# Patient Record
Sex: FEMALE | Race: WHITE | NOT HISPANIC OR LATINO | Employment: UNEMPLOYED | URBAN - METROPOLITAN AREA
[De-identification: names, ages, dates, MRNs, and addresses within clinical notes are randomized per-mention and may not be internally consistent; named-entity substitution may affect disease eponyms.]

---

## 2018-08-01 ENCOUNTER — HOSPITAL ENCOUNTER (OUTPATIENT)
Facility: MEDICAL CENTER | Age: 33
End: 2018-08-01
Attending: OBSTETRICS & GYNECOLOGY | Admitting: OBSTETRICS & GYNECOLOGY

## 2018-08-01 VITALS
BODY MASS INDEX: 28.88 KG/M2 | HEIGHT: 63 IN | SYSTOLIC BLOOD PRESSURE: 118 MMHG | TEMPERATURE: 98 F | DIASTOLIC BLOOD PRESSURE: 58 MMHG | WEIGHT: 163 LBS

## 2018-08-01 LAB
A1 MICROGLOB PLACENTAL VAG QL: NEGATIVE
A1 MICROGLOB PLACENTAL VAG QL: NEGATIVE

## 2018-08-01 PROCEDURE — 84112 EVAL AMNIOTIC FLUID PROTEIN: CPT

## 2018-08-01 PROCEDURE — 59025 FETAL NON-STRESS TEST: CPT | Performed by: OBSTETRICS & GYNECOLOGY

## 2018-08-01 ASSESSMENT — PAIN SCALES - GENERAL
PAINLEVEL_OUTOF10: 0
PAINLEVEL_OUTOF10: 0

## 2018-08-02 ENCOUNTER — HOSPITAL ENCOUNTER (INPATIENT)
Facility: MEDICAL CENTER | Age: 33
LOS: 15 days | End: 2018-08-17
Attending: OBSTETRICS & GYNECOLOGY | Admitting: OBSTETRICS & GYNECOLOGY
Payer: COMMERCIAL

## 2018-08-02 ENCOUNTER — APPOINTMENT (OUTPATIENT)
Dept: RADIOLOGY | Facility: MEDICAL CENTER | Age: 33
End: 2018-08-02
Attending: OBSTETRICS & GYNECOLOGY
Payer: COMMERCIAL

## 2018-08-02 LAB
ALBUMIN SERPL BCP-MCNC: 3.7 G/DL (ref 3.2–4.9)
ALBUMIN/GLOB SERPL: 1.4 G/DL
ALP SERPL-CCNC: 89 U/L (ref 30–99)
ALT SERPL-CCNC: 11 U/L (ref 2–50)
ANION GAP SERPL CALC-SCNC: 9 MMOL/L (ref 0–11.9)
AST SERPL-CCNC: 16 U/L (ref 12–45)
BILIRUB SERPL-MCNC: 0.6 MG/DL (ref 0.1–1.5)
BUN SERPL-MCNC: 4 MG/DL (ref 8–22)
CALCIUM SERPL-MCNC: 8.8 MG/DL (ref 8.5–10.5)
CHLORIDE SERPL-SCNC: 108 MMOL/L (ref 96–112)
CO2 SERPL-SCNC: 20 MMOL/L (ref 20–33)
CREAT SERPL-MCNC: 0.37 MG/DL (ref 0.5–1.4)
ERYTHROCYTE [DISTWIDTH] IN BLOOD BY AUTOMATED COUNT: 45.2 FL (ref 35.9–50)
GLOBULIN SER CALC-MCNC: 2.6 G/DL (ref 1.9–3.5)
GLUCOSE SERPL-MCNC: 85 MG/DL (ref 65–99)
HCT VFR BLD AUTO: 31.6 % (ref 37–47)
HGB BLD-MCNC: 10.1 G/DL (ref 12–16)
HOLDING TUBE BB 8507: NORMAL
MAGNESIUM SERPL-MCNC: 1.7 MG/DL (ref 1.5–2.5)
MAGNESIUM SERPL-MCNC: 3.7 MG/DL (ref 1.5–2.5)
MAGNESIUM SERPL-MCNC: 5.1 MG/DL (ref 1.5–2.5)
MCH RBC QN AUTO: 28.2 PG (ref 27–33)
MCHC RBC AUTO-ENTMCNC: 32 G/DL (ref 33.6–35)
MCV RBC AUTO: 88.3 FL (ref 81.4–97.8)
PLATELET # BLD AUTO: 229 K/UL (ref 164–446)
PMV BLD AUTO: 10 FL (ref 9–12.9)
POTASSIUM SERPL-SCNC: 3.8 MMOL/L (ref 3.6–5.5)
PROT SERPL-MCNC: 6.3 G/DL (ref 6–8.2)
RBC # BLD AUTO: 3.58 M/UL (ref 4.2–5.4)
SODIUM SERPL-SCNC: 137 MMOL/L (ref 135–145)
WBC # BLD AUTO: 8.2 K/UL (ref 4.8–10.8)

## 2018-08-02 PROCEDURE — 76815 OB US LIMITED FETUS(S): CPT

## 2018-08-02 PROCEDURE — 700111 HCHG RX REV CODE 636 W/ 250 OVERRIDE (IP): Performed by: OBSTETRICS & GYNECOLOGY

## 2018-08-02 PROCEDURE — 83735 ASSAY OF MAGNESIUM: CPT

## 2018-08-02 PROCEDURE — 770002 HCHG ROOM/CARE - OB PRIVATE (112)

## 2018-08-02 PROCEDURE — 59025 FETAL NON-STRESS TEST: CPT | Performed by: OBSTETRICS & GYNECOLOGY

## 2018-08-02 PROCEDURE — 80053 COMPREHEN METABOLIC PANEL: CPT

## 2018-08-02 PROCEDURE — 302135 SEQUENTIAL COMPRESSION MACHINE: Performed by: OBSTETRICS & GYNECOLOGY

## 2018-08-02 PROCEDURE — A9270 NON-COVERED ITEM OR SERVICE: HCPCS | Performed by: OBSTETRICS & GYNECOLOGY

## 2018-08-02 PROCEDURE — 700102 HCHG RX REV CODE 250 W/ 637 OVERRIDE(OP): Performed by: OBSTETRICS & GYNECOLOGY

## 2018-08-02 PROCEDURE — 700105 HCHG RX REV CODE 258: Performed by: OBSTETRICS & GYNECOLOGY

## 2018-08-02 PROCEDURE — 36415 COLL VENOUS BLD VENIPUNCTURE: CPT

## 2018-08-02 PROCEDURE — 302790 HCHG STAT ANTEPARTUM CARE, DAILY

## 2018-08-02 PROCEDURE — 87653 STREP B DNA AMP PROBE: CPT

## 2018-08-02 PROCEDURE — 85027 COMPLETE CBC AUTOMATED: CPT

## 2018-08-02 PROCEDURE — 700105 HCHG RX REV CODE 258

## 2018-08-02 RX ORDER — AZITHROMYCIN 250 MG/1
1000 TABLET, FILM COATED ORAL DAILY
Status: COMPLETED | OUTPATIENT
Start: 2018-08-02 | End: 2018-08-02

## 2018-08-02 RX ORDER — MAGNESIUM SULFATE HEPTAHYDRATE 40 MG/ML
2 INJECTION, SOLUTION INTRAVENOUS ONCE
Status: DISCONTINUED | OUTPATIENT
Start: 2018-08-02 | End: 2018-08-02

## 2018-08-02 RX ORDER — MAGNESIUM SULFATE HEPTAHYDRATE 40 MG/ML
4 INJECTION, SOLUTION INTRAVENOUS ONCE
Status: COMPLETED | OUTPATIENT
Start: 2018-08-02 | End: 2018-08-02

## 2018-08-02 RX ORDER — SODIUM CHLORIDE 9 MG/ML
INJECTION, SOLUTION INTRAVENOUS
Status: COMPLETED
Start: 2018-08-02 | End: 2018-08-02

## 2018-08-02 RX ORDER — AMPICILLIN 2 G/1
2 INJECTION, POWDER, FOR SOLUTION INTRAVENOUS EVERY 6 HOURS
Status: COMPLETED | OUTPATIENT
Start: 2018-08-02 | End: 2018-08-04

## 2018-08-02 RX ORDER — ONDANSETRON 2 MG/ML
4 INJECTION INTRAMUSCULAR; INTRAVENOUS EVERY 6 HOURS PRN
Status: DISCONTINUED | OUTPATIENT
Start: 2018-08-02 | End: 2018-08-14

## 2018-08-02 RX ORDER — MAGNESIUM SULFATE HEPTAHYDRATE 40 MG/ML
2 INJECTION, SOLUTION INTRAVENOUS CONTINUOUS
Status: DISCONTINUED | OUTPATIENT
Start: 2018-08-02 | End: 2018-08-05

## 2018-08-02 RX ORDER — SODIUM CHLORIDE, SODIUM LACTATE, POTASSIUM CHLORIDE, CALCIUM CHLORIDE 600; 310; 30; 20 MG/100ML; MG/100ML; MG/100ML; MG/100ML
INJECTION, SOLUTION INTRAVENOUS CONTINUOUS
Status: DISCONTINUED | OUTPATIENT
Start: 2018-08-02 | End: 2018-08-08

## 2018-08-02 RX ORDER — SODIUM CHLORIDE 9 MG/ML
INJECTION, SOLUTION INTRAVENOUS
Status: ACTIVE
Start: 2018-08-02 | End: 2018-08-03

## 2018-08-02 RX ORDER — BETAMETHASONE SODIUM PHOSPHATE AND BETAMETHASONE ACETATE 3; 3 MG/ML; MG/ML
12 INJECTION, SUSPENSION INTRA-ARTICULAR; INTRALESIONAL; INTRAMUSCULAR; SOFT TISSUE EVERY 24 HOURS
Status: COMPLETED | OUTPATIENT
Start: 2018-08-02 | End: 2018-08-03

## 2018-08-02 RX ADMIN — MAGNESIUM SULFATE IN WATER 4 G: 40 INJECTION, SOLUTION INTRAVENOUS at 10:52

## 2018-08-02 RX ADMIN — SODIUM CHLORIDE, POTASSIUM CHLORIDE, SODIUM LACTATE AND CALCIUM CHLORIDE: 600; 310; 30; 20 INJECTION, SOLUTION INTRAVENOUS at 10:45

## 2018-08-02 RX ADMIN — MAGNESIUM SULFATE IN WATER 2 G/HR: 40 INJECTION, SOLUTION INTRAVENOUS at 11:10

## 2018-08-02 RX ADMIN — SODIUM CHLORIDE 100 ML: 900 INJECTION INTRAVENOUS at 22:10

## 2018-08-02 RX ADMIN — BETAMETHASONE SODIUM PHOSPHATE AND BETAMETHASONE ACETATE 12 MG: 3; 3 INJECTION, SUSPENSION INTRA-ARTICULAR; INTRALESIONAL; INTRAMUSCULAR at 11:18

## 2018-08-02 RX ADMIN — AMPICILLIN SODIUM 2 G: 2 INJECTION, POWDER, FOR SOLUTION INTRAMUSCULAR; INTRAVENOUS at 22:10

## 2018-08-02 RX ADMIN — MAGNESIUM SULFATE IN WATER 2.5 G/HR: 40 INJECTION, SOLUTION INTRAVENOUS at 20:22

## 2018-08-02 RX ADMIN — AMPICILLIN SODIUM 2 G: 2 INJECTION, POWDER, FOR SOLUTION INTRAMUSCULAR; INTRAVENOUS at 16:14

## 2018-08-02 RX ADMIN — AZITHROMYCIN 1000 MG: 250 TABLET, FILM COATED ORAL at 16:44

## 2018-08-02 RX ADMIN — SODIUM CHLORIDE 100 ML: 900 INJECTION INTRAVENOUS at 16:15

## 2018-08-02 ASSESSMENT — COPD QUESTIONNAIRES
DURING THE PAST 4 WEEKS HOW MUCH DID YOU FEEL SHORT OF BREATH: NONE/LITTLE OF THE TIME
COPD SCREENING SCORE: 0
DO YOU EVER COUGH UP ANY MUCUS OR PHLEGM?: NO/ONLY WITH OCCASIONAL COLDS OR INFECTIONS
HAVE YOU SMOKED AT LEAST 100 CIGARETTES IN YOUR ENTIRE LIFE: NO/DON'T KNOW
IN THE PAST 12 MONTHS DO YOU DO LESS THAN YOU USED TO BECAUSE OF YOUR BREATHING PROBLEMS: DISAGREE/UNSURE

## 2018-08-02 ASSESSMENT — PAIN SCALES - GENERAL
PAINLEVEL_OUTOF10: 2
PAINLEVEL_OUTOF10: 0
PAINLEVEL_OUTOF10: 0
PAINLEVEL_OUTOF10: 4
PAINLEVEL_OUTOF10: 0
PAINLEVEL_OUTOF10: 0
PAINLEVEL_OUTOF10: 4
PAINLEVEL_OUTOF10: 0

## 2018-08-02 ASSESSMENT — PATIENT HEALTH QUESTIONNAIRE - PHQ9
1. LITTLE INTEREST OR PLEASURE IN DOING THINGS: NOT AT ALL
2. FEELING DOWN, DEPRESSED, IRRITABLE, OR HOPELESS: NOT AT ALL
SUM OF ALL RESPONSES TO PHQ9 QUESTIONS 1 AND 2: 0
1. LITTLE INTEREST OR PLEASURE IN DOING THINGS: NOT AT ALL
SUM OF ALL RESPONSES TO PHQ9 QUESTIONS 1 AND 2: 0
2. FEELING DOWN, DEPRESSED, IRRITABLE, OR HOPELESS: NOT AT ALL

## 2018-08-02 ASSESSMENT — LIFESTYLE VARIABLES
EVER_SMOKED: NEVER
ALCOHOL_USE: NO

## 2018-08-02 NOTE — H&P
History and Physical      Lou Vergara is a 32 y.o. year old female  at 32w2d who presents for gush of clear fluid since 330 pm yesterday.  She was evaluated on L&D with negative amnisure.  She continued to soak several pads at home, and had another gush of fluid this morning.  Received prenatal care in [place], but has not seen anyone here.     Subjective:   positive  For CTXS.   negative Feels pain   positive for LOF  negative for vaginal bleeding.   positive for fetal movement    ROS: Pertinent items are noted in HPI.    No past medical history on file. PMHx - unremarkable  No past surgical history on file. Hx of C/S x 3  OB History    Para Term  AB Living   4 3 2 1   3   SAB TAB Ectopic Molar Multiple Live Births                    # Outcome Date GA Lbr Aki/2nd Weight Sex Delivery Anes PTL Lv   4 Current            3             2 Term            1 Term                 Social History     Social History   • Marital status:      Spouse name: N/A   • Number of children: N/A   • Years of education: N/A     Occupational History   • Not on file.     Social History Main Topics   • Smoking status: Not on file   • Smokeless tobacco: Not on file   • Alcohol use Not on file   • Drug use: Unknown   • Sexual activity: Not on file     Other Topics Concern   • Not on file     Social History Narrative   • No narrative on file     Allergies: Patient has no known allergies.    Current Facility-Administered Medications:   •  lactated ringers infusion, , Intravenous, Continuous, Martina Marquez M.D.  •  ondansetron (ZOFRAN) syringe/vial injection 4 mg, 4 mg, Intravenous, Q6HRS PRN, Martina Marquez M.D.  •  magnesium sulfate 20 g/500mL infusion, 2 g/hr, Intravenous, Continuous, Martina Marquez M.D., Last Rate: 50 mL/hr at 18 1110, 2 g/hr at 18 1110  •  ampicillin (OMNIPEN) injection 2 g, 2 g, Intravenous, Q6HRS, Martina Marquez M.D.  •  azithromycin (ZITHROMAX) tablet 1,000 mg,  1,000 mg, Oral, DAILY, Martina Marquez M.D.  •  betamethasone acetate-betamethasone sodium phosphate (CELESTONE) injection 12 mg, 12 mg, Intramuscular, Q24HR, Martina Marquez M.D., 12 mg at 08/02/18 1118    Prenatal care with in [place] starting at 7 weeks with following problems:  There are no active problems to display for this patient.    Hx of C/S x 3  Hx of PTD at 34 wks  Hx of Tobacco use          Objective:      Blood pressure 118/61, pulse 75, resp. rate 18, SpO2 98 %.    General:   no acute distress   Skin:   normal   HEENT:  PERRLA   Lungs:   CTA bilateral   Heart:   S1, S2 normal, no murmur, click, rub or gallop, regular rate and rhythm, brisk carotid upstroke without bruits, peripheral pulses very brisk, chest is clear without rales or wheezing, no pedal edema, no JVD, no hepatosplenomegaly   Abdomen:   gravid, NT   EFW:  2000 grams   Pelvis:  adequate with gynecoid pelvis   FHT:  140s BPM, moderate variability, + accels   Uterine Size: S=D   Presentations: Cephalic   Cervix:     Dilation: Closed    Effacement: Long    Station:  Floating    Consistency: Firm    Position: Posterior     Lab Review  Lab:   Blood type: A+     Recent Results (from the past 5880 hour(s))   AMNISURE ROM ASSAY    Collection Time: 08/01/18  5:30 PM   Result Value Ref Range    AmniSure ROM Negative Negative   AMNISURE ROM ASSAY    Collection Time: 08/01/18  7:29 PM   Result Value Ref Range    AmniSure ROM Negative Negative   CBC WITHOUT DIFFERENTIAL    Collection Time: 08/02/18 10:15 AM   Result Value Ref Range    WBC 8.2 4.8 - 10.8 K/uL    RBC 3.58 (L) 4.20 - 5.40 M/uL    Hemoglobin 10.1 (L) 12.0 - 16.0 g/dL    Hematocrit 31.6 (L) 37.0 - 47.0 %    MCV 88.3 81.4 - 97.8 fL    MCH 28.2 27.0 - 33.0 pg    MCHC 32.0 (L) 33.6 - 35.0 g/dL    RDW 45.2 35.9 - 50.0 fL    Platelet Count 229 164 - 446 K/uL    MPV 10.0 9.0 - 12.9 fL   COMP METABOLIC PANEL    Collection Time: 08/02/18 10:15 AM   Result Value Ref Range    Sodium 137 135 - 145  mmol/L    Potassium 3.8 3.6 - 5.5 mmol/L    Chloride 108 96 - 112 mmol/L    Co2 20 20 - 33 mmol/L    Anion Gap 9.0 0.0 - 11.9    Glucose 85 65 - 99 mg/dL    Bun 4 (L) 8 - 22 mg/dL    Creatinine 0.37 (L) 0.50 - 1.40 mg/dL    Calcium 8.8 8.5 - 10.5 mg/dL    AST(SGOT) 16 12 - 45 U/L    ALT(SGPT) 11 2 - 50 U/L    Alkaline Phosphatase 89 30 - 99 U/L    Total Bilirubin 0.6 0.1 - 1.5 mg/dL    Albumin 3.7 3.2 - 4.9 g/dL    Total Protein 6.3 6.0 - 8.2 g/dL    Globulin 2.6 1.9 - 3.5 g/dL    A-G Ratio 1.4 g/dL   SERUM MAGNESIUM LEVEL 2 HRS     Collection Time: 08/02/18 10:15 AM   Result Value Ref Range    Magnesium 1.7 1.5 - 2.5 mg/dL   HOLD BLOOD BANK SPECIMEN (NOT TESTED)    Collection Time: 08/02/18 10:15 AM   Result Value Ref Range    Holding Tube - Bb DONE    ESTIMATED GFR    Collection Time: 08/02/18 10:15 AM   Result Value Ref Range    GFR If African American >60 >60 mL/min/1.73 m 2    GFR If Non African American >60 >60 mL/min/1.73 m 2        Assessment:   Lou Vergara at 32w2d  Labor status: PPROM, grossly ruptured    Obstetrical history significant for There are no active problems to display for this patient.  . Hx of previous C/S x 3  Undesired fertility  Hx of previous PTD @ 34 wks     Plan:     Admit to L&D  GBS unknown - obtained today  BMZ  Ampicillin  2 grams IV q 6 hours, Azithromycin 1 gram PO  Magnesium Sulfate 4 gram load, then 2 grams per hour  Will need repeat C/S for delivery.  Desires BTL.  Papers signed today.

## 2018-08-02 NOTE — PROGRESS NOTES
S) Patient is a 32 yoa G 4 P 3003 at at reported 32.1 week gestation. Transfer of care from [place]. Has appt with Ginger Warren but has not yet established care. Presents to L&D triage with possible leaking of fluid. No discomfort noted otherwise and good fetal movement reported.  O) cx by triage rn fingertip/thick. spec exam by L&D nurse no pooling noted.        FFN negative x 1 one hour apart       Small amount of irritability noted on toco prior to void. None after void.       FHT baseline 140, pos accels, no decels, moderate variability       VSS  A) IUP at 32 1/7 week      Membranes intact  P) DC home stable with s/s  labor. Keep appt with Ginger Warren at [facility]. FFN was not collected because patient had recent intercourse.

## 2018-08-02 NOTE — CARE PLAN
Problem: Knowledge Deficit  Goal: Patient/Support Person demonstrates understanding regarding condition, prognosis and treatment needs during the pregnancy  Pt educated on POC and encouraged to ask questions as they arise.       Problem: Risk for injury  Goal: Patient and fetus will be free of preventable injury/complications  FHT's will be monitored continuously

## 2018-08-02 NOTE — PROGRESS NOTES
1050 Report received from Babs GONZALEZ at bedside. POC discussed. External Monitors placed and pt oriented to room.   1052 Pt educated on magnesium sulfate. All questions answered. Magnesium sulfate bolus started. Pt tolerated well. Virginia GONZALEZ at bedside.   1100 Ultrasound at bedside.   1145 Xie placed using aseptic technique.   1430 Dr. Marquez at bedside. SVE = closed/50. Orders received to go up to 2.5g/hr.   1900 Report given to Muna GONZALEZ at bedside POC discussed.

## 2018-08-02 NOTE — PROGRESS NOTES
Report from CK Rogers RN. POC discussed.   Sterile spec performed, second amnisure collected. Amnisure negative.    Irritiability and cx noted on toco. Denies feeling cx, states she is feeling baby move. PAMELLA Dominguez CNM updated. New orders for SVE.   SVE Ft/thick/high.    PAMELLA Dominguez updated, reviewed tracing. Orders to discharge home with  labor precautions.    Discharge instructions reviewed with patient, encouraged to come back to L&D if anything changes and she feels she needs to be evaluated.    Pt ambulated off unit in stable condition with family.

## 2018-08-02 NOTE — PROGRESS NOTES
one  with SROM at 32 weeks pt is here with C/O SROM yesterday at 1530 clear fluid leakage and contractions.DR Marquez was called report given and order received to do animnisure, sterile spec done and fluid is coming out of speculum. Dr Marquez was called report given and order received to cancel amnisure and admit pt for mag sulfate.  IV started and GBS collected and sent to lab.  Pt was transferred to [facility] and report given to Kathleen GONZALEZ

## 2018-08-02 NOTE — PROGRESS NOTES
EDC- , EGA- 32.1, hx C/S X3    1700- Pt arrived to L&D with c/o possible SROM.  Pt had a large gush of clear fluid at 1530 that soaked through her pants and has been having scant LOF since.  Denies UC's or VB, reports +FM.  Pt had PPROM with her last pregnancy at 32 weeks.  Pt states she just moved here from [place] and has an appointment scheduled with Dr. Warren but has not seen her yet.  Report to Dr. Ladd, orders received for amnisure.  - SSE preformed, no pooling noted.  White discharge noted.  Amnisure sample collected and sent to lab.  - Amnisure results negative.  Report to Dr. Ladd.  Orders received to wait one hour then recheck amnisure.  Discussed with pt and FOB, verbalize understanding.  - Report to CARLOS Love.

## 2018-08-03 LAB
MAGNESIUM SERPL-MCNC: 5.4 MG/DL (ref 1.5–2.5)
MAGNESIUM SERPL-MCNC: 5.6 MG/DL (ref 1.5–2.5)
MAGNESIUM SERPL-MCNC: 5.9 MG/DL (ref 1.5–2.5)
MAGNESIUM SERPL-MCNC: 6 MG/DL (ref 1.5–2.5)

## 2018-08-03 PROCEDURE — 770002 HCHG ROOM/CARE - OB PRIVATE (112)

## 2018-08-03 PROCEDURE — 700105 HCHG RX REV CODE 258

## 2018-08-03 PROCEDURE — 36415 COLL VENOUS BLD VENIPUNCTURE: CPT

## 2018-08-03 PROCEDURE — 700105 HCHG RX REV CODE 258: Performed by: OBSTETRICS & GYNECOLOGY

## 2018-08-03 PROCEDURE — 700111 HCHG RX REV CODE 636 W/ 250 OVERRIDE (IP): Performed by: OBSTETRICS & GYNECOLOGY

## 2018-08-03 PROCEDURE — 83735 ASSAY OF MAGNESIUM: CPT | Mod: 91

## 2018-08-03 PROCEDURE — 302790 HCHG STAT ANTEPARTUM CARE, DAILY

## 2018-08-03 RX ORDER — SODIUM CHLORIDE 9 MG/ML
INJECTION, SOLUTION INTRAVENOUS
Status: ACTIVE
Start: 2018-08-03 | End: 2018-08-04

## 2018-08-03 RX ORDER — SODIUM CHLORIDE 9 MG/ML
INJECTION, SOLUTION INTRAVENOUS
Status: COMPLETED
Start: 2018-08-03 | End: 2018-08-03

## 2018-08-03 RX ADMIN — MAGNESIUM SULFATE IN WATER 2 G/HR: 40 INJECTION, SOLUTION INTRAVENOUS at 11:31

## 2018-08-03 RX ADMIN — AMPICILLIN SODIUM 2 G: 2 INJECTION, POWDER, FOR SOLUTION INTRAMUSCULAR; INTRAVENOUS at 15:55

## 2018-08-03 RX ADMIN — AZITHROMYCIN FOR INJECTION INJECTION, POWDER, LYOPHILIZED, FOR SOLUTION 500 MG: 500 INJECTION INTRAVENOUS at 16:35

## 2018-08-03 RX ADMIN — SODIUM CHLORIDE 100 ML: 900 INJECTION INTRAVENOUS at 11:18

## 2018-08-03 RX ADMIN — MAGNESIUM SULFATE IN WATER 2.5 G/HR: 40 INJECTION, SOLUTION INTRAVENOUS at 04:14

## 2018-08-03 RX ADMIN — SODIUM CHLORIDE 100 ML: 900 INJECTION INTRAVENOUS at 04:18

## 2018-08-03 RX ADMIN — AMPICILLIN SODIUM 2 G: 2 INJECTION, POWDER, FOR SOLUTION INTRAMUSCULAR; INTRAVENOUS at 11:18

## 2018-08-03 RX ADMIN — MAGNESIUM SULFATE IN WATER 2 G/HR: 40 INJECTION, SOLUTION INTRAVENOUS at 21:22

## 2018-08-03 RX ADMIN — AMPICILLIN SODIUM 2 G: 2 INJECTION, POWDER, FOR SOLUTION INTRAMUSCULAR; INTRAVENOUS at 22:25

## 2018-08-03 RX ADMIN — SODIUM CHLORIDE, POTASSIUM CHLORIDE, SODIUM LACTATE AND CALCIUM CHLORIDE 1000 ML: 600; 310; 30; 20 INJECTION, SOLUTION INTRAVENOUS at 11:31

## 2018-08-03 RX ADMIN — AMPICILLIN SODIUM 2 G: 2 INJECTION, POWDER, FOR SOLUTION INTRAMUSCULAR; INTRAVENOUS at 04:18

## 2018-08-03 RX ADMIN — BETAMETHASONE SODIUM PHOSPHATE AND BETAMETHASONE ACETATE 12 MG: 3; 3 INJECTION, SUSPENSION INTRA-ARTICULAR; INTRALESIONAL; INTRAMUSCULAR at 11:18

## 2018-08-03 RX ADMIN — SODIUM CHLORIDE, POTASSIUM CHLORIDE, SODIUM LACTATE AND CALCIUM CHLORIDE: 600; 310; 30; 20 INJECTION, SOLUTION INTRAVENOUS at 01:17

## 2018-08-03 ASSESSMENT — PATIENT HEALTH QUESTIONNAIRE - PHQ9
1. LITTLE INTEREST OR PLEASURE IN DOING THINGS: NOT AT ALL
2. FEELING DOWN, DEPRESSED, IRRITABLE, OR HOPELESS: NOT AT ALL
SUM OF ALL RESPONSES TO PHQ9 QUESTIONS 1 AND 2: 0

## 2018-08-03 ASSESSMENT — PAIN SCALES - GENERAL: PAINLEVEL_OUTOF10: 0

## 2018-08-03 NOTE — CARE PLAN
Problem: Safety  Goal: Free from accidental injury  Outcome: PROGRESSING AS EXPECTED  Patient/Family instructed to call RN with any spills, cords in the way on the floor or any other safety hazards. Patient/Family also instructed to call RN with change to LOC, feelings of dizziness, blurry vision or any change to comfort or concern for safety.   Medication administered as ordered, verified with patient/scanned in to MAR and armband on patient.     Problem: Infection  Goal: Will remain free from infection  Outcome: PROGRESSING AS EXPECTED  Ongoing observation and assessment of signs/symptoms of potential infection. Patient/family aware of importance of handwashing and available foam on the wall for use as well.    Problem: Venous Thromboembolism (VTW)/Deep Vein Thrombosis (DVT) Prevention:  Goal: Patient will participate in Venous Thrombosis (VTE)/Deep Vein Thrombosis (DVT)Prevention Measures  Ongoing discussion regarding importance of moving extremities frequently. Ambulating to the BR, walking to the sink and while in bed stretching legs/arms, moving feet and pointing toes toward the wall and the ceiling frequently.

## 2018-08-03 NOTE — PROGRESS NOTES
Antepartum Progress Note:    32w3d  Admission DX: Pregnancy @ 32w2d,  premature rupture of membranes, hx of c/s x3      Date of Admission: 2018    Subjective:   uterine contractions:yes, isolated and less than previously  pain: .no  LOF: yes  vaginal Bleeding: no  fetal movement: normal    Objective:   Vitals:    18 1027 18 1123 18 1215 18 1308   BP: 110/57 112/56 113/65 109/58   Pulse:  79  87   Resp: 16 16 17 16   Temp: 36.2 °C (97.1 °F) 36.1 °C (96.9 °F) 36.1 °C (97 °F) 36.4 °C (97.6 °F)   TempSrc:       SpO2:  98% 97% 95%     Baseline FHR: 150 per minute, mod annita/+accels/no decels  Rare ctx    Gen: AAO, NAD  Abdomen: gravid, soft, NT  Ext: SCDs on, Nt, SCDs in place    Meds:     Current Facility-Administered Medications:   •  azithromycin (ZITHROMAX) 500 mg in D5W 250 mL IVPB, 500 mg, Intravenous, Q24HRS, Medina Schneider M.D.  •  lactated ringers infusion, , Intravenous, Continuous, Martina Marquez M.D., Last Rate: 125 mL/hr at 18 1131, 1,000 mL at 18 1131  •  ondansetron (ZOFRAN) syringe/vial injection 4 mg, 4 mg, Intravenous, Q6HRS PRN, Martina Marquez M.D.  •  magnesium sulfate 20 g/500mL infusion, 2 g/hr, Intravenous, Continuous, Martina Marquez M.D., Last Rate: 50 mL/hr at 18 1131, 2 g/hr at 18 1131  •  ampicillin (OMNIPEN) injection 2 g, 2 g, Intravenous, Q6HRS, Martina Marquez M.D., 2 g at 18 1118    Labs:    Lab:   Recent Results (from the past 72 hour(s))   AMNISURE ROM ASSAY    Collection Time: 18  5:30 PM   Result Value Ref Range    AmniSure ROM Negative Negative   AMNISURE ROM ASSAY    Collection Time: 18  7:29 PM   Result Value Ref Range    AmniSure ROM Negative Negative   CBC WITHOUT DIFFERENTIAL    Collection Time: 18 10:15 AM   Result Value Ref Range    WBC 8.2 4.8 - 10.8 K/uL    RBC 3.58 (L) 4.20 - 5.40 M/uL    Hemoglobin 10.1 (L) 12.0 - 16.0 g/dL    Hematocrit 31.6 (L) 37.0 - 47.0 %    MCV 88.3 81.4 - 97.8 fL     MCH 28.2 27.0 - 33.0 pg    MCHC 32.0 (L) 33.6 - 35.0 g/dL    RDW 45.2 35.9 - 50.0 fL    Platelet Count 229 164 - 446 K/uL    MPV 10.0 9.0 - 12.9 fL   COMP METABOLIC PANEL    Collection Time: 18 10:15 AM   Result Value Ref Range    Sodium 137 135 - 145 mmol/L    Potassium 3.8 3.6 - 5.5 mmol/L    Chloride 108 96 - 112 mmol/L    Co2 20 20 - 33 mmol/L    Anion Gap 9.0 0.0 - 11.9    Glucose 85 65 - 99 mg/dL    Bun 4 (L) 8 - 22 mg/dL    Creatinine 0.37 (L) 0.50 - 1.40 mg/dL    Calcium 8.8 8.5 - 10.5 mg/dL    AST(SGOT) 16 12 - 45 U/L    ALT(SGPT) 11 2 - 50 U/L    Alkaline Phosphatase 89 30 - 99 U/L    Total Bilirubin 0.6 0.1 - 1.5 mg/dL    Albumin 3.7 3.2 - 4.9 g/dL    Total Protein 6.3 6.0 - 8.2 g/dL    Globulin 2.6 1.9 - 3.5 g/dL    A-G Ratio 1.4 g/dL   SERUM MAGNESIUM LEVEL 2 HRS     Collection Time: 18 10:15 AM   Result Value Ref Range    Magnesium 1.7 1.5 - 2.5 mg/dL   HOLD BLOOD BANK SPECIMEN (NOT TESTED)    Collection Time: 18 10:15 AM   Result Value Ref Range    Holding Tube - Bb DONE    ESTIMATED GFR    Collection Time: 18 10:15 AM   Result Value Ref Range    GFR If African American >60 >60 mL/min/1.73 m 2    GFR If Non African American >60 >60 mL/min/1.73 m 2   SERUM MAGNESIUM LEVELS     Collection Time: 18 12:31 PM   Result Value Ref Range    Magnesium 3.7 (H) 1.5 - 2.5 mg/dL   SERUM MAGNESIUM LEVELS     Collection Time: 18  7:09 PM   Result Value Ref Range    Magnesium 5.1 (HH) 1.5 - 2.5 mg/dL   SERUM MAGNESIUM LEVELS     Collection Time: 18  1:03 AM   Result Value Ref Range    Magnesium 5.6 (HH) 1.5 - 2.5 mg/dL   SERUM MAGNESIUM LEVELS     Collection Time: 18  7:32 AM   Result Value Ref Range    Magnesium 6.0 (HH) 1.5 - 2.5 mg/dL       A/P:  32 y.o.  @ 32w3d with PPROM, hx of c/s x3  - PPROM, admitted  and latency abx started.  Today is day .  Discussed w/ pt that if stable after first 48hrs will switch to PO amp/erythro (or azithro if not  available) for another 5 days  - currently on magnesium sulfate for tocolysis through steroid window.  Received BMZ #2 this AM.  Currently at 2.5g/hr, will decrease dose to 2g/hr.  Discussed w/ pt indication for tocolysis is temporary during BMZ administration and will not be used for long term tocolysis.  To turn off no later than tomorrow 11AM  - FHTs reassuring  - prior c/s x3 for repeat; desires BTL.  Medicaid consent signed yesterday, need 72hrs after signature to meet criteria  - fetal monitoring: continuous  - GBS pending  - ppx: SCDs    Discussed plan of care with latency abx x7days, tocolysis for 48hrs then watchful waiting until 34wks or earlier if delivery indicated (labor, signs of infection or change in fetal status).  All questions answered.      Medina Schneider MD  [facility], Women's Health

## 2018-08-03 NOTE — PROGRESS NOTES
0700 - Report received from Muna CALDERA RN, care assumed. Ibarra Gestation today at 32.1 weeks    Patient in bed awake without family/friends at ; patient is not expecting visitors today. Reports some sleep last night, reports an occasional contraction, denies denies discomfort. Denies ill feeling, reports frequent FM, SROM on 8/1 - fluid noted on the linen under patient reveals a small amount of clear fluid without odor or color. No Bleeding, denies change to vision/edema/HA; states she has been getting up to the BR herself without assist, denies dizziness or weakness.    FM/[place] use discussed and in place, discussed POC, cheerful affect/mood, denies questions/concerns regarding care since arrival to [facility]. Patient encouraged to call RN with all questions/concerns/needs. The dry erase board updated with RN/CNA contact information, reviewed.      1900 - Report to Janet RODRIGUEZ RN

## 2018-08-03 NOTE — PROGRESS NOTES
1900_ Assumed pt care. Report from JENNIFER Perez RN. POC reviewed with pt and understanding verbalized.  2150_ Pt can eat per KULDEEP Craft. Check with MD before every meal.  0700_ Report to CK Cline. CARLOS

## 2018-08-03 NOTE — CARE PLAN
Problem: Infection  Goal: Will remain free from infection  Outcome: PROGRESSING AS EXPECTED  Pt afebrile and no s/s of infection.    Problem: Knowledge Deficit  Goal: Knowledge of disease process/condition, treatment plan, diagnostic tests, and medications will improve  Outcome: PROGRESSING AS EXPECTED  POC reviewed with pt and understanding verbalized.

## 2018-08-04 LAB
MAGNESIUM SERPL-MCNC: 4.7 MG/DL (ref 1.5–2.5)
MAGNESIUM SERPL-MCNC: 4.7 MG/DL (ref 1.5–2.5)
MAGNESIUM SERPL-MCNC: 4.8 MG/DL (ref 1.5–2.5)
MAGNESIUM SERPL-MCNC: 5 MG/DL (ref 1.5–2.5)

## 2018-08-04 PROCEDURE — 700102 HCHG RX REV CODE 250 W/ 637 OVERRIDE(OP): Performed by: OBSTETRICS & GYNECOLOGY

## 2018-08-04 PROCEDURE — 302790 HCHG STAT ANTEPARTUM CARE, DAILY

## 2018-08-04 PROCEDURE — 700105 HCHG RX REV CODE 258: Performed by: OBSTETRICS & GYNECOLOGY

## 2018-08-04 PROCEDURE — 83735 ASSAY OF MAGNESIUM: CPT | Mod: 91

## 2018-08-04 PROCEDURE — A9270 NON-COVERED ITEM OR SERVICE: HCPCS | Performed by: OBSTETRICS & GYNECOLOGY

## 2018-08-04 PROCEDURE — 700111 HCHG RX REV CODE 636 W/ 250 OVERRIDE (IP): Performed by: OBSTETRICS & GYNECOLOGY

## 2018-08-04 PROCEDURE — 770002 HCHG ROOM/CARE - OB PRIVATE (112)

## 2018-08-04 PROCEDURE — 36415 COLL VENOUS BLD VENIPUNCTURE: CPT

## 2018-08-04 RX ORDER — SODIUM CHLORIDE 9 MG/ML
INJECTION, SOLUTION INTRAVENOUS
Status: ACTIVE
Start: 2018-08-04 | End: 2018-08-04

## 2018-08-04 RX ORDER — AMOXICILLIN 500 MG/1
500 CAPSULE ORAL EVERY 8 HOURS
Status: COMPLETED | OUTPATIENT
Start: 2018-08-04 | End: 2018-08-09

## 2018-08-04 RX ADMIN — AMOXICILLIN 500 MG: 500 CAPSULE ORAL at 18:12

## 2018-08-04 RX ADMIN — SODIUM CHLORIDE, POTASSIUM CHLORIDE, SODIUM LACTATE AND CALCIUM CHLORIDE: 600; 310; 30; 20 INJECTION, SOLUTION INTRAVENOUS at 01:49

## 2018-08-04 RX ADMIN — SODIUM CHLORIDE, POTASSIUM CHLORIDE, SODIUM LACTATE AND CALCIUM CHLORIDE: 600; 310; 30; 20 INJECTION, SOLUTION INTRAVENOUS at 15:39

## 2018-08-04 RX ADMIN — AMPICILLIN SODIUM 2 G: 2 INJECTION, POWDER, FOR SOLUTION INTRAMUSCULAR; INTRAVENOUS at 04:34

## 2018-08-04 RX ADMIN — MAGNESIUM SULFATE IN WATER 2 G/HR: 40 INJECTION, SOLUTION INTRAVENOUS at 16:09

## 2018-08-04 RX ADMIN — MAGNESIUM SULFATE IN WATER 2 G/HR: 40 INJECTION, SOLUTION INTRAVENOUS at 06:30

## 2018-08-04 RX ADMIN — AMPICILLIN SODIUM 2 G: 2 INJECTION, POWDER, FOR SOLUTION INTRAMUSCULAR; INTRAVENOUS at 10:03

## 2018-08-04 ASSESSMENT — PAIN SCALES - GENERAL
PAINLEVEL_OUTOF10: 0
PAINLEVEL_OUTOF10: 0

## 2018-08-04 NOTE — PROGRESS NOTES
ANTEPARTUM PROGRESS NOTE;    Lou Vregara is a 32 y.o. female  at 32w4d.-Walk-in patient admitted on  with spontaneous rupture the membranes on  at 1530 hrs. patient has prenatal care in [facility]-medical records in media section. Well-established/confirmed dates.  Patient was started on magnesium sulfate therapy received a course of betamethasone and antibiotics cervix reported to be long and closed on digital examination on admission-today patient denies uterine contractions abdominal pain leakage of fluid or vaginal bleeding.    There are no active problems to display for this patient.      Review of systems; denies vaginal bleeding, leakage of fluid, uterine contractions, fever chills or abdominal pain  Past Medical History:   Diagnosis Date   • TMJ arthritis      Past Surgical History:   Procedure Laterality Date   • OTHER      Tonselectomy     Patient has no known allergies.  Social History     Social History   • Marital status:      Spouse name: N/A   • Number of children: N/A   • Years of education: N/A     Occupational History   • Not on file.     Social History Main Topics   • Smoking status: Never Smoker   • Smokeless tobacco: Never Used   • Alcohol use No   • Drug use: No   • Sexual activity: Not on file     Other Topics Concern   • Not on file     Social History Narrative   • No narrative on file         Physical examination;  Alert and oriented x3  Gen.-well-developed well-nourished female in no apparent distress  HEENT-normocephalic, nontraumatic,EOMI,PERRLA  /59   Pulse 86   Temp 36.4 °C (97.5 °F)   Resp 17   Wt 73.9 kg (163 lb)   SpO2 92%   BMI 28.87 kg/m²   Skin is warm and dry  Back-negative for CVA tenderness  Cardiovascular-regular rate and rhythm, normal S1-S2 no murmurs gallops  Lungs-clear to stitch bilaterally  Abdomen-nondistended positive bowel sounds soft nontender without masses or hepatosplenomegaly  Cervix-long and  closed  Extremities without cyanosis clubbing or edema  Neurologic grossly intact    Labs;      NST-as performed and read by myself; reactive NST without contractions    Impression;  IUP AT 32w4d  PPSROM-18-or evidence of chorioamnionitis, status post betamethasone currently on magnesium sulfate  Previous  section ×3    Plan;  Expectant management for now-if signs of chorioamnionitis develop deliver earlier  Repeat  section at 34 weeks  Discontinue magnesium sulfate today        Errol Camara MD

## 2018-08-04 NOTE — PROGRESS NOTES
0745-EFM adjusted.  Assessment=WNL, pulse ox off.  Pt has no c/o UCs, states pos FM-but less.  Pad changed, small amount of pink fluid seen.  Pt requests NICU consult.  1145-Dr. Camara called, pt requesting fernandes catheter to be removed.  Ok to remove per Dr. Camara.  1150-Pt up to BR, partial bath given and linens changed.  1200-back in bed.  1500-NICU called-I requested NICU consult,.  1600-Dr. Camara phoned to verify continuation of Magnesium Sulfate-orders to cont., magnesium sulfate continuous infusion.  1900-Report given to Daisy GONZALEZ

## 2018-08-04 NOTE — CARE PLAN
Problem: Risk for Infection, Impaired Wound Healing  Goal: Remain free from signs and symptoms of infection  Outcome: PROGRESSING AS EXPECTED  No s/s of infection noted at this time, pt remains afebrile    Problem: Pain  Goal: Alleviation of Pain or a reduction in pain to the patient's comfort goal  Outcome: PROGRESSING AS EXPECTED  Pt denies pain at this time.

## 2018-08-04 NOTE — PROGRESS NOTES
EDC - 18 EGA - 32.4    1900 - Report received from CK Cline RN. Pt denies needs.   Reha from Lab called with critical result of Magnesium at 5.4. Critical lab result read back to Reha.   This critical lab result is within parameters established by Dr. Russell for this patient   Assessment complete at this time, POC discussed with patient and FOB at this time, all questions answered.  0200 Pt sleeping at this time  0430 Pt sleeping at this time.  0610 Dr. Russell reviewed tracing at this time, continue POC.  0700 Report given to CK Mckay RN, Dr. Russell states pt can eat this morning.

## 2018-08-05 LAB
GP B STREP DNA SPEC QL NAA+PROBE: POSITIVE
MAGNESIUM SERPL-MCNC: 4.4 MG/DL (ref 1.5–2.5)

## 2018-08-05 PROCEDURE — A9270 NON-COVERED ITEM OR SERVICE: HCPCS | Performed by: OBSTETRICS & GYNECOLOGY

## 2018-08-05 PROCEDURE — 36415 COLL VENOUS BLD VENIPUNCTURE: CPT

## 2018-08-05 PROCEDURE — 770002 HCHG ROOM/CARE - OB PRIVATE (112)

## 2018-08-05 PROCEDURE — 700102 HCHG RX REV CODE 250 W/ 637 OVERRIDE(OP): Performed by: OBSTETRICS & GYNECOLOGY

## 2018-08-05 PROCEDURE — 83735 ASSAY OF MAGNESIUM: CPT

## 2018-08-05 PROCEDURE — 302790 HCHG STAT ANTEPARTUM CARE, DAILY

## 2018-08-05 RX ORDER — POLYETHYLENE GLYCOL 3350 17 G/17G
1 POWDER, FOR SOLUTION ORAL DAILY
Status: DISCONTINUED | OUTPATIENT
Start: 2018-08-05 | End: 2018-08-17 | Stop reason: HOSPADM

## 2018-08-05 RX ADMIN — MAGNESIUM HYDROXIDE 30 ML: 400 SUSPENSION ORAL at 09:16

## 2018-08-05 RX ADMIN — AMOXICILLIN 500 MG: 500 CAPSULE ORAL at 14:02

## 2018-08-05 RX ADMIN — AMOXICILLIN 500 MG: 500 CAPSULE ORAL at 21:49

## 2018-08-05 RX ADMIN — AMOXICILLIN 500 MG: 500 CAPSULE ORAL at 05:29

## 2018-08-05 ASSESSMENT — PAIN SCALES - GENERAL: PAINLEVEL_OUTOF10: 0

## 2018-08-05 ASSESSMENT — PATIENT HEALTH QUESTIONNAIRE - PHQ9
SUM OF ALL RESPONSES TO PHQ9 QUESTIONS 1 AND 2: 0
2. FEELING DOWN, DEPRESSED, IRRITABLE, OR HOPELESS: NOT AT ALL
SUM OF ALL RESPONSES TO PHQ9 QUESTIONS 1 AND 2: 0
1. LITTLE INTEREST OR PLEASURE IN DOING THINGS: NOT AT ALL
2. FEELING DOWN, DEPRESSED, IRRITABLE, OR HOPELESS: NOT AT ALL
1. LITTLE INTEREST OR PLEASURE IN DOING THINGS: NOT AT ALL

## 2018-08-05 NOTE — PROGRESS NOTES
1900: Report received from CK Mckay RN.     1930: Dr. Caceres at bedside for NICU consultation.     1945: Assessment completed, POC discussed, and all questions and concerns addressed. Pt declines UC's, states having small amount of pink tinged fluid on pads, states +FM, and declines VB.     0208: Dr. Camara updated with patient status, orders received to discontinue magnesium sulfate at this time.     0700: Report given to NEEMA Araujo RN.

## 2018-08-05 NOTE — CARE PLAN
Problem: Knowledge Deficit  Goal: Patient/Support Person demonstrates understanding regarding condition, prognosis and treatment needs during the pregnancy    Intervention: Learning assessment and teaching  POC discussed and pt states understanding at this time.  Pt asks questions as they present.

## 2018-08-05 NOTE — CARE PLAN
Problem: Safety  Goal: Free from accidental injury  Outcome: PROGRESSING AS EXPECTED  Pt remains free from injury, safety plan in place, pt calls out for assistance to the bathroom.     Problem: Risk for Infection, Impaired Wound Healing  Goal: Remain free from signs and symptoms of infection  Outcome: PROGRESSING AS EXPECTED  Pt remains free from signs/symptoms of infection, pt afebrile.

## 2018-08-05 NOTE — CARE PLAN
Problem: Pain  Goal: Patient will have relaxed facial expressions and be able to rest between uterine contractions  Outcome: MET Date Met: 08/05/18  Pt not noticing when she has UC's, pt understands to notify RN if she is hurting with any UC's.

## 2018-08-05 NOTE — PROGRESS NOTES
0700  Report from NOC RN in room with pt at this time.  Pt resting and RN to return shortly for assesement.  0745  Pt reports that she has not had a BM for about 5 days.  0810  Report to Dr. Ladd and orders received at this time.  0820  In room with pt and POC discussed and pt states understanding at this time.  0915  PO medications given for BM.  1300  Throughout the morning pt up to BR a few times and reports that she did have a small BM.  Pt reports that she feels like she is back to normal now with that.  1530  In with pt and pt has no new complaints at this time. 1600  Report to Dr. Ladd and pt may shower at this time.  1805  Pt up to shower and has no new report of leaking or bleeding.  1850  Report to NOC RN in room with pt at this time.

## 2018-08-05 NOTE — CARE PLAN
Problem: Risk for Infection, Impaired Wound Healing  Goal: Remain free from signs and symptoms of infection    Intervention: Infection prevention measures  Hand hygiene before and after pt care

## 2018-08-05 NOTE — CONSULTS
"NEONATOLOGY CONSULT NOTE  2018 10:22 PM    Asked to see this patient due to PPROM at 32 weeks. Patient is a 31 yo old , now pregnant at 32 4/7 weeks gestation with current pregnancy complicated by PPROM, treated with magnesium, Ampicillin (has received multiple doses) and BMTZ (-8/3). Plan is for repeat c/s at 34 weeks. Discussed problems of prematurity at 32-34 completed weeks with patient. 3 children present. Discussed anticipated NICU course and answered their questions. Plan to name the baby girl Edna and call her \"Angeline\". Patient plans to breastfeed/pump breast milk; DBM was reviewed and she expressed interest. They appear comfortable with plans.    Thank you for allowing us to be involved in the care of this patient and her family.    Electronically signed by:  Antionette Caceres MD  Neonatologist     "

## 2018-08-06 LAB
BASOPHILS # BLD AUTO: 0.2 % (ref 0–1.8)
BASOPHILS # BLD: 0.02 K/UL (ref 0–0.12)
EOSINOPHIL # BLD AUTO: 0.04 K/UL (ref 0–0.51)
EOSINOPHIL NFR BLD: 0.4 % (ref 0–6.9)
ERYTHROCYTE [DISTWIDTH] IN BLOOD BY AUTOMATED COUNT: 46.1 FL (ref 35.9–50)
HCT VFR BLD AUTO: 30.6 % (ref 37–47)
HGB BLD-MCNC: 10 G/DL (ref 12–16)
IMM GRANULOCYTES # BLD AUTO: 0.18 K/UL (ref 0–0.11)
IMM GRANULOCYTES NFR BLD AUTO: 1.9 % (ref 0–0.9)
LYMPHOCYTES # BLD AUTO: 1.89 K/UL (ref 1–4.8)
LYMPHOCYTES NFR BLD: 20.4 % (ref 22–41)
MCH RBC QN AUTO: 28.7 PG (ref 27–33)
MCHC RBC AUTO-ENTMCNC: 32.7 G/DL (ref 33.6–35)
MCV RBC AUTO: 87.7 FL (ref 81.4–97.8)
MONOCYTES # BLD AUTO: 0.59 K/UL (ref 0–0.85)
MONOCYTES NFR BLD AUTO: 6.4 % (ref 0–13.4)
NEUTROPHILS # BLD AUTO: 6.53 K/UL (ref 2–7.15)
NEUTROPHILS NFR BLD: 70.7 % (ref 44–72)
NRBC # BLD AUTO: 0 K/UL
NRBC BLD-RTO: 0 /100 WBC
PLATELET # BLD AUTO: 253 K/UL (ref 164–446)
PMV BLD AUTO: 10 FL (ref 9–12.9)
RBC # BLD AUTO: 3.49 M/UL (ref 4.2–5.4)
WBC # BLD AUTO: 9.3 K/UL (ref 4.8–10.8)

## 2018-08-06 PROCEDURE — 700102 HCHG RX REV CODE 250 W/ 637 OVERRIDE(OP): Performed by: OBSTETRICS & GYNECOLOGY

## 2018-08-06 PROCEDURE — 302790 HCHG STAT ANTEPARTUM CARE, DAILY

## 2018-08-06 PROCEDURE — A9270 NON-COVERED ITEM OR SERVICE: HCPCS | Performed by: OBSTETRICS & GYNECOLOGY

## 2018-08-06 PROCEDURE — 85025 COMPLETE CBC W/AUTO DIFF WBC: CPT

## 2018-08-06 PROCEDURE — 770002 HCHG ROOM/CARE - OB PRIVATE (112)

## 2018-08-06 PROCEDURE — 36415 COLL VENOUS BLD VENIPUNCTURE: CPT

## 2018-08-06 RX ORDER — FERROUS GLUCONATE 324(38)MG
324 TABLET ORAL 2 TIMES DAILY
Status: DISCONTINUED | OUTPATIENT
Start: 2018-08-06 | End: 2018-08-17 | Stop reason: HOSPADM

## 2018-08-06 RX ADMIN — POLYETHYLENE GLYCOL 3350 1 PACKET: 17 POWDER, FOR SOLUTION ORAL at 07:54

## 2018-08-06 RX ADMIN — AMOXICILLIN 500 MG: 500 CAPSULE ORAL at 13:51

## 2018-08-06 RX ADMIN — FERROUS GLUCONATE 324 MG: 324 TABLET ORAL at 22:17

## 2018-08-06 RX ADMIN — AMOXICILLIN 500 MG: 500 CAPSULE ORAL at 05:33

## 2018-08-06 RX ADMIN — AMOXICILLIN 500 MG: 500 CAPSULE ORAL at 22:17

## 2018-08-06 ASSESSMENT — PATIENT HEALTH QUESTIONNAIRE - PHQ9
2. FEELING DOWN, DEPRESSED, IRRITABLE, OR HOPELESS: NOT AT ALL
SUM OF ALL RESPONSES TO PHQ9 QUESTIONS 1 AND 2: 0
1. LITTLE INTEREST OR PLEASURE IN DOING THINGS: NOT AT ALL
1. LITTLE INTEREST OR PLEASURE IN DOING THINGS: NOT AT ALL
2. FEELING DOWN, DEPRESSED, IRRITABLE, OR HOPELESS: NOT AT ALL
SUM OF ALL RESPONSES TO PHQ9 QUESTIONS 1 AND 2: 0

## 2018-08-06 ASSESSMENT — PAIN SCALES - GENERAL
PAINLEVEL_OUTOF10: 0

## 2018-08-06 NOTE — PROGRESS NOTES
0700 report received, assessment done, pt comfortable, denies tenderness or bleeding, has more leakage of fluid but it has no odor or color. A CBC was ordered by DR Watts  1200 no change. Pt was taken for a tour of NICU with her mother.  1500 pt feels some tenderness in lower abdomen for about 15 min. DR Watts was notified, DR Watts in the room, spoke to pt but pt explains that she feels better now. Temp is normal.  1800 pt is feeling better does not feel any UCs.  1900 Report given to Maria Guadalupe Saul RN

## 2018-08-06 NOTE — CARE PLAN
Problem: Pain  Goal: Alleviation of Pain or a reduction in pain to the patient's comfort goal  Outcome: PROGRESSING AS EXPECTED  Patient denies pain or other problems @ this time;.

## 2018-08-06 NOTE — PROGRESS NOTES
Report received and plan of care discussed.  Patient awake, denies feeling painful or frequent UC's, denies uterine tenderness, states baby has been a lot more active.  Patient denies vaginal bleeding but is leaking clear amniotic fluid with no foul odor.  No c/o pain or other problems.  0600--Patient states she had a good night.  Pt encouraged to situate herself on her sides and not on her back.  0700--Report to oncoming RN and plan of care discussed.

## 2018-08-06 NOTE — CARE PLAN
Problem: Infection  Goal: Will remain free from infection  Outcome: PROGRESSING AS EXPECTED  Patient afebrile, no s/s of infection.  Denies uterine tenderness, denies foul odor from amniotic fluid.

## 2018-08-06 NOTE — CARE PLAN
Problem: Infection  Goal: Will remain free from infection  Outcome: PROGRESSING AS EXPECTED  Pt remains free of infection.

## 2018-08-06 NOTE — PROGRESS NOTES
Lou Vergara   32w6d  Admission DX: Pregnancy., PPROM  Labor and delivery, indication for care    Date of Admission: 8/2/2018  There are no active problems to display for this patient.      Subjective:   uterine contractions:no  pain: .no  LOF: yes  vaginal Bleeding: no  fetal movement: normal    Objective:   Vitals:    08/05/18 1532 08/05/18 1948 08/06/18 0004 08/06/18 0533   BP: 116/61 126/61 104/57 115/60   Pulse: 72 68 68 71   Resp: 17 20 20 20   Temp: 36.4 °C (97.6 °F) 36.2 °C (97.2 °F) 36.4 °C (97.5 °F) 36.3 °C (97.3 °F)   TempSrc:  Temporal Temporal Temporal   SpO2:       Weight:         Fetal Non-stress Test: reactive  Gen: AAOX3, NAD  Membranes: ruptured: yes  Abdomen: gravid, soft, NT  Ext: SCDs on, Nt, no cyanosis or clubbing    Meds:     Current Facility-Administered Medications:   •  polyethylene glycol/lytes (MIRALAX) PACKET 1 Packet, 1 Packet, Oral, DAILY, Julee Quezada M.D., 1 Packet at 08/06/18 0754  •  magnesium hydroxide (MILK OF MAGNESIA) suspension 30 mL, 30 mL, Oral, 4X/DAY PRN, Julee Quezada M.D., 30 mL at 08/05/18 0916  •  amoxicillin (AMOXIL) capsule 500 mg, 500 mg, Oral, Q8HRS, Errol Camara M.D., 500 mg at 08/06/18 0533  •  lactated ringers infusion, , Intravenous, Continuous, Martina Marquez M.D., Stopped at 08/05/18 0210  •  ondansetron (ZOFRAN) syringe/vial injection 4 mg, 4 mg, Intravenous, Q6HRS PRN, Martina Marquez M.D.    Labs:    Lab:   Recent Results (from the past 72 hour(s))   SERUM MAGNESIUM LEVELS     Collection Time: 08/03/18  1:05 PM   Result Value Ref Range    Magnesium 5.9 (HH) 1.5 - 2.5 mg/dL   SERUM MAGNESIUM LEVELS     Collection Time: 08/03/18  7:43 PM   Result Value Ref Range    Magnesium 5.4 (HH) 1.5 - 2.5 mg/dL   SERUM MAGNESIUM LEVELS     Collection Time: 08/04/18  1:04 AM   Result Value Ref Range    Magnesium 4.8 (H) 1.5 - 2.5 mg/dL   SERUM MAGNESIUM LEVELS     Collection Time: 08/04/18  7:02 AM   Result Value Ref Range    Magnesium 5.0 (H) 1.5  - 2.5 mg/dL   SERUM MAGNESIUM LEVELS     Collection Time: 18  1:11 PM   Result Value Ref Range    Magnesium 4.7 (H) 1.5 - 2.5 mg/dL   SERUM MAGNESIUM LEVELS     Collection Time: 18  7:07 PM   Result Value Ref Range    Magnesium 4.7 (H) 1.5 - 2.5 mg/dL   SERUM MAGNESIUM LEVELS     Collection Time: 18  1:09 AM   Result Value Ref Range    Magnesium 4.4 (H) 1.5 - 2.5 mg/dL       Assessment:  32 y.o.  @ 32w6d  PPROM    PLAN:  S/P mag, steroids, on latency abx  Plan for delivery at 34 weeks unless labour/infection

## 2018-08-06 NOTE — CARE PLAN
Problem: Communication  Goal: The ability to communicate needs accurately and effectively will improve  Outcome: PROGRESSING AS EXPECTED  Spoke to pt re letting us know about every need she has and all her concerns.

## 2018-08-07 ENCOUNTER — APPOINTMENT (OUTPATIENT)
Dept: RADIOLOGY | Facility: MEDICAL CENTER | Age: 33
End: 2018-08-07
Attending: OBSTETRICS & GYNECOLOGY
Payer: COMMERCIAL

## 2018-08-07 PROCEDURE — 700102 HCHG RX REV CODE 250 W/ 637 OVERRIDE(OP): Performed by: OBSTETRICS & GYNECOLOGY

## 2018-08-07 PROCEDURE — A9270 NON-COVERED ITEM OR SERVICE: HCPCS | Performed by: OBSTETRICS & GYNECOLOGY

## 2018-08-07 PROCEDURE — 770002 HCHG ROOM/CARE - OB PRIVATE (112)

## 2018-08-07 PROCEDURE — 76819 FETAL BIOPHYS PROFIL W/O NST: CPT

## 2018-08-07 RX ADMIN — AMOXICILLIN 500 MG: 500 CAPSULE ORAL at 13:44

## 2018-08-07 RX ADMIN — FERROUS GLUCONATE 324 MG: 324 TABLET ORAL at 07:54

## 2018-08-07 RX ADMIN — POLYETHYLENE GLYCOL 3350 1 PACKET: 17 POWDER, FOR SOLUTION ORAL at 07:54

## 2018-08-07 RX ADMIN — AMOXICILLIN 500 MG: 500 CAPSULE ORAL at 06:00

## 2018-08-07 RX ADMIN — AMOXICILLIN 500 MG: 500 CAPSULE ORAL at 21:52

## 2018-08-07 RX ADMIN — FERROUS GLUCONATE 324 MG: 324 TABLET ORAL at 20:01

## 2018-08-07 ASSESSMENT — PATIENT HEALTH QUESTIONNAIRE - PHQ9
2. FEELING DOWN, DEPRESSED, IRRITABLE, OR HOPELESS: NOT AT ALL
2. FEELING DOWN, DEPRESSED, IRRITABLE, OR HOPELESS: NOT AT ALL
1. LITTLE INTEREST OR PLEASURE IN DOING THINGS: NOT AT ALL
SUM OF ALL RESPONSES TO PHQ9 QUESTIONS 1 AND 2: 0
SUM OF ALL RESPONSES TO PHQ9 QUESTIONS 1 AND 2: 0
1. LITTLE INTEREST OR PLEASURE IN DOING THINGS: NOT AT ALL

## 2018-08-07 ASSESSMENT — PAIN SCALES - GENERAL
PAINLEVEL_OUTOF10: 0

## 2018-08-07 NOTE — CARE PLAN
Problem: Communication  Goal: The ability to communicate needs accurately and effectively will improve  Outcome: PROGRESSING AS EXPECTED  Spoke to pt regarding her feelings about her stay here

## 2018-08-07 NOTE — PROGRESS NOTES
Report received, assessment done. Pt is comfortable. Denies any tenderness or bleeding. Fluid leakage is clear and odorless. Pt does not feel any contractions. POC was discussed with pt.  1200 no change, FHT reactive at times and at times she is having minimal variability.  1545 it was discussed with DR Pickard and order received for a BPP.  1620 US is here.  1650 US done. 8 out of 8 and JENNY of 10.  1900 Report given to Kasandra night shift RN

## 2018-08-07 NOTE — PROGRESS NOTES
Report received and plan of care discussed.  Patient awake, denies feeling frequent or painful UC's but states she has an occasional crampy feeling.  States she is leaking small to moderate amounts of clear amniotic fluid from vagina, no bleeding, no foul smell from amniotic fluid.  States baby has been moving well.  No other problems @ this time.  2030--Monserrat Haq and Laura in to see patient.  Pt sitting at a 90 degree angle and having some late decels with UC's.  Pt encouraged to lie on her side with her head down at 30 degree angle.    0600--Pt states she is feeling pretty well.  States she feels the baby moving, denies feeling any UC's @ this point, no uterine tenderness, no foul smell from clear amniotic fluid and no vaginal bleeding.  Pt remains afebrile.    0700--Report to oncoming RN and plan of care discussed.

## 2018-08-07 NOTE — PROGRESS NOTES
Lou Vergara   32w6d  Admission DX:   PPROM  Prior  x 3    Date of Admission: 2018  There are no active problems to display for this patient.      Subjective:   uterine contractions:no  pain: .yes - now resolved, was having cramping RLQ earlier  LOF: yes  vaginal Bleeding: no  fetal movement: normal  No f/c, no hematuria, no urgency/frequency, no diarrhea/constipation, no N/V    Objective:   Vitals:    18 0726 18 1050 18 1500 18 1605   BP: 111/73 107/57  116/63   Pulse: 76 80  82   Resp:    Temp: 36.6 °C (97.9 °F) 36.7 °C (98 °F) 37.1 °C (98.7 °F) 36.6 °C (97.8 °F)   TempSrc: Temporal Temporal Temporal Temporal   SpO2:       Weight:         FHR: 150, pos accels, neg decels, moderate variability, Cat 1 FHR, reactive  [place]: no ctx  Gen: NAD, comfortable  Membranes: ruptured: yes  Abdomen: gravid, soft, NT  Ext: SCDs on, Nt, no cyanosis or clubbing    Meds:     Current Facility-Administered Medications:   •  ferrous gluconate (FERGON) tablet 324 mg, 324 mg, Oral, BID, Cheli Santacruz M.D.  •  polyethylene glycol/lytes (MIRALAX) PACKET 1 Packet, 1 Packet, Oral, DAILY, Julee Quezada M.D., 1 Packet at 18 0754  •  magnesium hydroxide (MILK OF MAGNESIA) suspension 30 mL, 30 mL, Oral, 4X/DAY PRN, Julee Quezada M.D., 30 mL at 18 0916  •  amoxicillin (AMOXIL) capsule 500 mg, 500 mg, Oral, Q8HRS, Errol Camara M.D., 500 mg at 18 1351  •  lactated ringers infusion, , Intravenous, Continuous, Martina Marquez M.D., Stopped at 18 0210  •  ondansetron (ZOFRAN) syringe/vial injection 4 mg, 4 mg, Intravenous, Q6HRS PRN, Martina Marquez M.D.    Labs:    Lab:   Recent Results (from the past 72 hour(s))   SERUM MAGNESIUM LEVELS     Collection Time: 18  7:43 PM   Result Value Ref Range    Magnesium 5.4 (HH) 1.5 - 2.5 mg/dL   SERUM MAGNESIUM LEVELS     Collection Time: 18  1:04 AM   Result Value Ref Range    Magnesium 4.8 (H) 1.5 -  2.5 mg/dL   SERUM MAGNESIUM LEVELS     Collection Time: 18  7:02 AM   Result Value Ref Range    Magnesium 5.0 (H) 1.5 - 2.5 mg/dL   SERUM MAGNESIUM LEVELS     Collection Time: 18  1:11 PM   Result Value Ref Range    Magnesium 4.7 (H) 1.5 - 2.5 mg/dL   SERUM MAGNESIUM LEVELS     Collection Time: 18  7:07 PM   Result Value Ref Range    Magnesium 4.7 (H) 1.5 - 2.5 mg/dL   SERUM MAGNESIUM LEVELS     Collection Time: 18  1:09 AM   Result Value Ref Range    Magnesium 4.4 (H) 1.5 - 2.5 mg/dL   CBC WITH DIFFERENTIAL    Collection Time: 18 12:16 PM   Result Value Ref Range    WBC 9.3 4.8 - 10.8 K/uL    RBC 3.49 (L) 4.20 - 5.40 M/uL    Hemoglobin 10.0 (L) 12.0 - 16.0 g/dL    Hematocrit 30.6 (L) 37.0 - 47.0 %    MCV 87.7 81.4 - 97.8 fL    MCH 28.7 27.0 - 33.0 pg    MCHC 32.7 (L) 33.6 - 35.0 g/dL    RDW 46.1 35.9 - 50.0 fL    Platelet Count 253 164 - 446 K/uL    MPV 10.0 9.0 - 12.9 fL    Neutrophils-Polys 70.70 44.00 - 72.00 %    Lymphocytes 20.40 (L) 22.00 - 41.00 %    Monocytes 6.40 0.00 - 13.40 %    Eosinophils 0.40 0.00 - 6.90 %    Basophils 0.20 0.00 - 1.80 %    Immature Granulocytes 1.90 (H) 0.00 - 0.90 %    Nucleated RBC 0.00 /100 WBC    Neutrophils (Absolute) 6.53 2.00 - 7.15 K/uL    Lymphs (Absolute) 1.89 1.00 - 4.80 K/uL    Monos (Absolute) 0.59 0.00 - 0.85 K/uL    Eos (Absolute) 0.04 0.00 - 0.51 K/uL    Baso (Absolute) 0.02 0.00 - 0.12 K/uL    Immature Granulocytes (abs) 0.18 (H) 0.00 - 0.11 K/uL    NRBC (Absolute) 0.00 K/uL       Assessment:  32 y.o.  @ 32w6d  PPROM  Prior  x 3  Desires sterilization  Prenatal care elsewhere  GBS positive    Plan:  Continue antepartum care  Continue PO antibiotics  Fetal monitoring/toco  Deliver at 34 weeks or with signs of labor/infection/fetal distress

## 2018-08-07 NOTE — CARE PLAN
Problem: Pain  Goal: Alleviation of Pain or a reduction in pain to the patient's comfort goal  Outcome: PROGRESSING AS EXPECTED  Other than an occasional crampy feeling, pt has no c/o pain at this time.

## 2018-08-07 NOTE — PROGRESS NOTES
On Call OB/GYN acceptance Note   32 y.o.  female , presented as [place] from [place] at [id number] , prev C/s x 3 , and PPROM . Patent admitted and has undergone Magnesium , steroids and antibiotic Rx . She is now off magnesium and on latency antibiotic Rx .   Patient desires BTL , and has signed > 72hrs ago. Plan is to allow patient to reach 34 weeks , or de;liver for maternal/fetal reasons     Vitals:    18 1050 18 1500 18 1605 18 1950   BP: 107/57  116/63 114/61   Pulse: 80  82 80   Resp: 16 18 18 17   Temp: 36.7 °C (98 °F) 37.1 °C (98.7 °F) 36.6 °C (97.8 °F) 36.7 °C (98.1 °F)   TempSrc: Temporal Temporal Temporal    SpO2:       Weight:         -150 , good variability , with occasional deceleration with recovery     General : in nad , all questions answered , complications for repeat C/S discussed as well as issues with placentation     Ass:   32w6d   PPROM   Prev C/S x 3   Desires BTL   P. CPM   Make sure COD is available

## 2018-08-07 NOTE — PROGRESS NOTES
Lou Vergara   33w0d  Admission DX: Pregnancy  Labor and delivery, indication for care  PPROM  Prior  x 3  Desires sterilization    Date of Admission: 2018  There are no active problems to display for this patient.      Subjective:   uterine contractions:no  pain: .no  LOF: yes  vaginal Bleeding: no  fetal movement: normal    Objective:   Vitals:    18 2343 18 0353 18 0757 18 1600   BP: 100/58 (!) 99/57 129/62 112/60   Pulse: 69 67 88 92   Resp:     Temp: 36.3 °C (97.4 °F) 36.3 °C (97.3 °F) 36.9 °C (98.5 °F) 36.6 °C (97.8 °F)   TempSrc: Temporal Temporal Temporal Temporal   SpO2:       Weight:         FHR: 150, pos accels, rare variable decels, moderate variability w/ small periods of minimal variability, overall reassuring, reactive  [place]: no ctx  Gen: NAD, comfortable  Membranes: ruptured: yes  Abdomen: gravid, soft, NT  Ext: SCDs on, Nt, no cyanosis or clubbing    Meds:     Current Facility-Administered Medications:   •  ferrous gluconate (FERGON) tablet 324 mg, 324 mg, Oral, BID, Cheli Santacruz M.D., 324 mg at 18 0754  •  polyethylene glycol/lytes (MIRALAX) PACKET 1 Packet, 1 Packet, Oral, DAILY, Julee Quezada M.D., 1 Packet at 18 0754  •  magnesium hydroxide (MILK OF MAGNESIA) suspension 30 mL, 30 mL, Oral, 4X/DAY PRN, Julee Quezada M.D., 30 mL at 18 0916  •  amoxicillin (AMOXIL) capsule 500 mg, 500 mg, Oral, Q8HRS, Errol Camara M.D., 500 mg at 18 1344  •  lactated ringers infusion, , Intravenous, Continuous, Martina Marquez M.D., Stopped at 18 0210  •  ondansetron (ZOFRAN) syringe/vial injection 4 mg, 4 mg, Intravenous, Q6HRS PRN, RICHA OsborneD.    Labs:    Lab:   Recent Results (from the past 72 hour(s))   SERUM MAGNESIUM LEVELS     Collection Time: 18  7:07 PM   Result Value Ref Range    Magnesium 4.7 (H) 1.5 - 2.5 mg/dL   SERUM MAGNESIUM LEVELS     Collection Time: 18  1:09 AM   Result Value  Ref Range    Magnesium 4.4 (H) 1.5 - 2.5 mg/dL   CBC WITH DIFFERENTIAL    Collection Time: 18 12:16 PM   Result Value Ref Range    WBC 9.3 4.8 - 10.8 K/uL    RBC 3.49 (L) 4.20 - 5.40 M/uL    Hemoglobin 10.0 (L) 12.0 - 16.0 g/dL    Hematocrit 30.6 (L) 37.0 - 47.0 %    MCV 87.7 81.4 - 97.8 fL    MCH 28.7 27.0 - 33.0 pg    MCHC 32.7 (L) 33.6 - 35.0 g/dL    RDW 46.1 35.9 - 50.0 fL    Platelet Count 253 164 - 446 K/uL    MPV 10.0 9.0 - 12.9 fL    Neutrophils-Polys 70.70 44.00 - 72.00 %    Lymphocytes 20.40 (L) 22.00 - 41.00 %    Monocytes 6.40 0.00 - 13.40 %    Eosinophils 0.40 0.00 - 6.90 %    Basophils 0.20 0.00 - 1.80 %    Immature Granulocytes 1.90 (H) 0.00 - 0.90 %    Nucleated RBC 0.00 /100 WBC    Neutrophils (Absolute) 6.53 2.00 - 7.15 K/uL    Lymphs (Absolute) 1.89 1.00 - 4.80 K/uL    Monos (Absolute) 0.59 0.00 - 0.85 K/uL    Eos (Absolute) 0.04 0.00 - 0.51 K/uL    Baso (Absolute) 0.02 0.00 - 0.12 K/uL    Immature Granulocytes (abs) 0.18 (H) 0.00 - 0.11 K/uL    NRBC (Absolute) 0.00 K/uL       Assessment:  32 y.o.  @ 33w0d  PPROM 18 - s/p BMZ x 2, mag sulfate, and currently on PO latency antibiotics  Prior  x 3  Desires sterilization  GBS positive    Plan:  Continue antepartum management  Fetal monitoring/toco  Check BPP today  Deliver at 34 wks or with signs of chorio/labor/fetal distress

## 2018-08-07 NOTE — CARE PLAN
Problem: Infection  Goal: Will remain free from infection  Outcome: PROGRESSING AS EXPECTED  Pt afebrile, no uterine tenderness at this time, no foul smell from amniotic fluid.

## 2018-08-08 LAB
ABO GROUP BLD: NORMAL
ABO GROUP BLD: NORMAL
BLD GP AB SCN SERPL QL: NORMAL
RH BLD: NORMAL
RH BLD: NORMAL

## 2018-08-08 PROCEDURE — 700105 HCHG RX REV CODE 258: Performed by: OBSTETRICS & GYNECOLOGY

## 2018-08-08 PROCEDURE — 86900 BLOOD TYPING SEROLOGIC ABO: CPT

## 2018-08-08 PROCEDURE — 302790 HCHG STAT ANTEPARTUM CARE, DAILY

## 2018-08-08 PROCEDURE — A9270 NON-COVERED ITEM OR SERVICE: HCPCS | Performed by: OBSTETRICS & GYNECOLOGY

## 2018-08-08 PROCEDURE — 700102 HCHG RX REV CODE 250 W/ 637 OVERRIDE(OP): Performed by: OBSTETRICS & GYNECOLOGY

## 2018-08-08 PROCEDURE — 770002 HCHG ROOM/CARE - OB PRIVATE (112)

## 2018-08-08 PROCEDURE — 86901 BLOOD TYPING SEROLOGIC RH(D): CPT

## 2018-08-08 PROCEDURE — 36415 COLL VENOUS BLD VENIPUNCTURE: CPT

## 2018-08-08 PROCEDURE — 86850 RBC ANTIBODY SCREEN: CPT

## 2018-08-08 RX ORDER — HYDROXYZINE PAMOATE 50 MG/1
50 CAPSULE ORAL ONCE
Status: COMPLETED | OUTPATIENT
Start: 2018-08-08 | End: 2018-08-08

## 2018-08-08 RX ADMIN — AMOXICILLIN 500 MG: 500 CAPSULE ORAL at 05:41

## 2018-08-08 RX ADMIN — FERROUS GLUCONATE 324 MG: 324 TABLET ORAL at 10:19

## 2018-08-08 RX ADMIN — AMOXICILLIN 500 MG: 500 CAPSULE ORAL at 22:02

## 2018-08-08 RX ADMIN — AMOXICILLIN 500 MG: 500 CAPSULE ORAL at 14:19

## 2018-08-08 RX ADMIN — SODIUM CHLORIDE, POTASSIUM CHLORIDE, SODIUM LACTATE AND CALCIUM CHLORIDE: 600; 310; 30; 20 INJECTION, SOLUTION INTRAVENOUS at 10:19

## 2018-08-08 RX ADMIN — FERROUS GLUCONATE 324 MG: 324 TABLET ORAL at 18:05

## 2018-08-08 RX ADMIN — HYDROXYZINE PAMOATE 50 MG: 50 CAPSULE ORAL at 03:13

## 2018-08-08 RX ADMIN — SODIUM CHLORIDE, POTASSIUM CHLORIDE, SODIUM LACTATE AND CALCIUM CHLORIDE 300 ML: 600; 310; 30; 20 INJECTION, SOLUTION INTRAVENOUS at 04:47

## 2018-08-08 ASSESSMENT — PAIN SCALES - GENERAL
PAINLEVEL_OUTOF10: 0
PAINLEVEL_OUTOF10: 3
PAINLEVEL_OUTOF10: 3

## 2018-08-08 NOTE — FLOWSHEET NOTE
0687- Report received care assumed; patient sleeping in bed -  family at BS; patient is expecting visitors sometime today. Reports good sleep last night, denies contractions/discomfort denies ill feeling, reports frequent FM, no new leaking no Bleeding, No change to vision/edema/HA; states she has been getting up to the BR herself without assist denies dizziness or weakness. Gestation today of 33.1 weeks, FM/[place] to be placed cont, discussed POC, Cheerful affect/mood, denies questions/concerns at this point, states understanding of POC/expectiations Patient encouraged to call RN with all questions/concerns/needs. RN contact information written on the white information board.

## 2018-08-08 NOTE — PROGRESS NOTES
1900- Report received from TYRELL Bazzi RN. Pt resting in bed watching TV. POC discussed.     2000- Pt assessment. Reports +FM and LOF. Denies VB, UCs, or pain.     0132- Pt called out c/o VB. Dr. Forrester updated by phone.    0134- Dr. Forrester at bedside for evaluation. SVE FT/50%. Pt reporting lower abdominal cramping.     0156- Update to Dr. Haq, in unit, regarding pt status.    0250- Update to Dr. Haq, in unit, regarding pt c/o continued abdominal cramping with no improvement, 3 on 0 to 10 pain scale. Order for Vistaril received.      0440- Pt c/o continued lower abdominal cramping but reports improvement since Vistaril administration. Updated Dr. Haq in unit. IV fluids ordered.    0600- Strip reviewed with Dr. Haq. Periodic decel noted at 0547.    0700- Report given to GRETA Andrews RN. Pt resting in bed. POC discussed.     0715- Strip reviewed with GRETA Andrews RN. Prolonged decel noted at 0620.

## 2018-08-08 NOTE — CARE PLAN
Problem: Risk for Infection, Impaired Wound Healing  Goal: Remain free from signs and symptoms of infection  Outcome: PROGRESSING AS EXPECTED  Pt is afebrile.     Problem: Risk for injury  Goal: Patient and fetus will be free of preventable injury/complications  Outcome: PROGRESSING AS EXPECTED  Continuous EFM and TOCO in place to monitor fetal well being and uterine activity.    Problem: Pain  Goal: Alleviation of Pain or a reduction in pain to the patient's comfort goal  Outcome: PROGRESSING AS EXPECTED  Pt denies pain

## 2018-08-08 NOTE — PROGRESS NOTES
Antepartum Progress Note    Name:   Lou Vergara   Date/Time:  2018 2:20 AM  Gestational Age:  33w1d  Admit Date:   2018  Admitting Dx:      Pregnancy @ 33W1D  PPROM  Previous  x3   S/P Betamethasone x2 and IV antibiotics      Subjective: Pt woke up and went to void. Reports bleeding. Examination of pad shows pink bleeding  Uterine contractions: no  Pain:    no  Complaints:   yes , lower pelvic cramping after return to bed  Headache: .  no  Blurred vision:  no   SOB:    no   Nausea/vomiting:  no  Epigastric pain:  no  Fetal movement:  normal  Vaginal bleeding: . yes    Objective:   Vitals:    18 1600 18 1923 18 2348 18 0137   BP: 112/60 116/56 (!) 99/57 125/59   Pulse: 92 77 85 75   Resp:     Temp: 36.6 °C (97.8 °F) 36.4 °C (97.5 °F) 36.2 °C (97.1 °F) 36.2 °C (97.1 °F)   TempSrc: Temporal   Temporal   SpO2:       Weight:         Fetal heart variability: periods of minimal to moderate. No contractions on tocometer.  Cervical: 50% ;  Dilatation .Fingertip ; Station -3. No blood on glove during exam    Fetal position:  Breech  Membranes ruptured: Yes, PPROM      Meds:     Current Facility-Administered Medications:   •  ferrous gluconate (FERGON) tablet 324 mg, 324 mg, Oral, BID, Cheli Santacruz M.D., 324 mg at 18  •  polyethylene glycol/lytes (MIRALAX) PACKET 1 Packet, 1 Packet, Oral, DAILY, Julee Quezada M.D., 1 Packet at 18 0754  •  magnesium hydroxide (MILK OF MAGNESIA) suspension 30 mL, 30 mL, Oral, 4X/DAY PRN, Julee Quezada M.D., 30 mL at 18 0916  •  amoxicillin (AMOXIL) capsule 500 mg, 500 mg, Oral, Q8HRS, Errol Camara M.D., 500 mg at 18 2152  •  lactated ringers infusion, , Intravenous, Continuous, Martina Marquez M.D., Stopped at 18 0210  •  ondansetron (ZOFRAN) syringe/vial injection 4 mg, 4 mg, Intravenous, Q6HRS PRN, Martina Marquez M.D.    Labs:  Recent Results (from the past 72 hour(s))   CBC  WITH DIFFERENTIAL    Collection Time: 18 12:16 PM   Result Value Ref Range    WBC 9.3 4.8 - 10.8 K/uL    RBC 3.49 (L) 4.20 - 5.40 M/uL    Hemoglobin 10.0 (L) 12.0 - 16.0 g/dL    Hematocrit 30.6 (L) 37.0 - 47.0 %    MCV 87.7 81.4 - 97.8 fL    MCH 28.7 27.0 - 33.0 pg    MCHC 32.7 (L) 33.6 - 35.0 g/dL    RDW 46.1 35.9 - 50.0 fL    Platelet Count 253 164 - 446 K/uL    MPV 10.0 9.0 - 12.9 fL    Neutrophils-Polys 70.70 44.00 - 72.00 %    Lymphocytes 20.40 (L) 22.00 - 41.00 %    Monocytes 6.40 0.00 - 13.40 %    Eosinophils 0.40 0.00 - 6.90 %    Basophils 0.20 0.00 - 1.80 %    Immature Granulocytes 1.90 (H) 0.00 - 0.90 %    Nucleated RBC 0.00 /100 WBC    Neutrophils (Absolute) 6.53 2.00 - 7.15 K/uL    Lymphs (Absolute) 1.89 1.00 - 4.80 K/uL    Monos (Absolute) 0.59 0.00 - 0.85 K/uL    Eos (Absolute) 0.04 0.00 - 0.51 K/uL    Baso (Absolute) 0.02 0.00 - 0.12 K/uL    Immature Granulocytes (abs) 0.18 (H) 0.00 - 0.11 K/uL    NRBC (Absolute) 0.00 K/uL         Assessment:    Pregnancy @ 33W1D  PPROM  Previous  x3   S/P Betamethasone x2 and IV antibiotics  Undesired fertility      Plan:      Continue present management  Continuous monitoring  Blood type/T&S ordered  NPO  Precautions and plan of care reviewed. Will proceed with repeat CS and BTL for any sign of distress, PTL, Active bleeding, infection or abruption    There are no active problems to display for this patient.      Luiz Forrester M.D.

## 2018-08-09 PROCEDURE — 700102 HCHG RX REV CODE 250 W/ 637 OVERRIDE(OP): Performed by: OBSTETRICS & GYNECOLOGY

## 2018-08-09 PROCEDURE — A9270 NON-COVERED ITEM OR SERVICE: HCPCS | Performed by: OBSTETRICS & GYNECOLOGY

## 2018-08-09 PROCEDURE — 770002 HCHG ROOM/CARE - OB PRIVATE (112)

## 2018-08-09 PROCEDURE — 302790 HCHG STAT ANTEPARTUM CARE, DAILY

## 2018-08-09 RX ADMIN — POLYETHYLENE GLYCOL 3350 1 PACKET: 17 POWDER, FOR SOLUTION ORAL at 08:38

## 2018-08-09 RX ADMIN — AMOXICILLIN 500 MG: 500 CAPSULE ORAL at 05:43

## 2018-08-09 RX ADMIN — FERROUS GLUCONATE 324 MG: 324 TABLET ORAL at 08:38

## 2018-08-09 RX ADMIN — AMOXICILLIN 500 MG: 500 CAPSULE ORAL at 14:28

## 2018-08-09 RX ADMIN — FERROUS GLUCONATE 324 MG: 324 TABLET ORAL at 19:37

## 2018-08-09 ASSESSMENT — PAIN SCALES - GENERAL
PAINLEVEL_OUTOF10: 0

## 2018-08-09 ASSESSMENT — PATIENT HEALTH QUESTIONNAIRE - PHQ9
SUM OF ALL RESPONSES TO PHQ9 QUESTIONS 1 AND 2: 0
1. LITTLE INTEREST OR PLEASURE IN DOING THINGS: NOT AT ALL
2. FEELING DOWN, DEPRESSED, IRRITABLE, OR HOPELESS: NOT AT ALL

## 2018-08-09 NOTE — PROGRESS NOTES
1900: Report received from GRETA Andrews RN. Assessment completed, POC discussed, and all questions and concerns addressed. Pt states +FM, declines UC's, and states having mild pink fluid leaking, but no other VB.

## 2018-08-09 NOTE — PROGRESS NOTES
0700-Report received from DENNISE Bangura RN.    4666-MD at bedside. POC discussed with patient,  section tentatively scheduled for , patient understanding verbalized. Pt denies UC's or pain.     0900-Pt resting comfortably in bed, EFM repositioned, pt denies needs.     1005-Pt resting comfortably in bed, no needs at this time.     1400-Pt resting comfortably in bed, no needs at this time.

## 2018-08-09 NOTE — PROGRESS NOTES
Antepartum  Progress Note    Name:   Lou Vergara   Date/Time:  2018 9:21 AM  Gestational Age:  33w2d  Admit Date:   2018  Admitting Dx:      Pregnancy @ 33W2D  PPROM   x3  Undesired fertility  Breech presentation      Subjective:Patient is Day #7 on antepartum with the above diagnosis. Pt is reclining in bed without complaints. She is finishing up her oral antibiotics today.  Uterine contractions: yes  Pain:    no  Complaints:   no   Headache: .  no  Blurred vision:  no   SOB:    no   Nausea/vomiting:  no  Epigastric pain:  no  Fetal movement:  normal  Vaginal bleeding: . No  + rare LOF    Objective:   Vitals:    18 1924 18 0005 18 0341 18 0741   BP: 122/63 109/58 100/56 119/66   Pulse: 97 87 72 82   Resp:  18    Temp: 36.7 °C (98 °F) 36.2 °C (97.2 °F) 36.4 °C (97.5 °F) 36.2 °C (97.1 °F)   TempSrc:       SpO2:       Weight:       Height:         Fetal heart variability: moderate variability with accelerations and one isolated deceleration down to 70 bpm for 2 minutes this morning on chart review. Currently tracing is category 1  Cervical: deferred  Fetal position:   Breech on US few days ago  Membranes ruptured: yes      EXAM:  AAO x3, NAD  Neck supple  Chest: CTA  CVS: RRR  ABD: soft, gravid, NT  EXT: No c/c/e or calf pain      Meds:     Current Facility-Administered Medications:   •  ferrous gluconate (FERGON) tablet 324 mg, 324 mg, Oral, BID, Cheli Santacruz M.D., 324 mg at 18 0838  •  polyethylene glycol/lytes (MIRALAX) PACKET 1 Packet, 1 Packet, Oral, DAILY, Julee Quezada M.D., 1 Packet at 18 0838  •  magnesium hydroxide (MILK OF MAGNESIA) suspension 30 mL, 30 mL, Oral, 4X/DAY PRN, Julee Quezada M.D., 30 mL at 18 0916  •  amoxicillin (AMOXIL) capsule 500 mg, 500 mg, Oral, Q8HRS, Errol Camara M.D., 500 mg at 18 0543  •  ondansetron (ZOFRAN) syringe/vial injection 4 mg, 4 mg, Intravenous, Q6HRS PRN, Martina SOLIMAN  JEANNIE Marquez  Labs:  Recent Results (from the past 72 hour(s))   ABO AND RH CONFIRMATION    Collection Time: 18 12:14 PM   Result Value Ref Range    ABO Confirm A     Second Rh Group POS    CBC WITH DIFFERENTIAL    Collection Time: 18 12:16 PM   Result Value Ref Range    WBC 9.3 4.8 - 10.8 K/uL    RBC 3.49 (L) 4.20 - 5.40 M/uL    Hemoglobin 10.0 (L) 12.0 - 16.0 g/dL    Hematocrit 30.6 (L) 37.0 - 47.0 %    MCV 87.7 81.4 - 97.8 fL    MCH 28.7 27.0 - 33.0 pg    MCHC 32.7 (L) 33.6 - 35.0 g/dL    RDW 46.1 35.9 - 50.0 fL    Platelet Count 253 164 - 446 K/uL    MPV 10.0 9.0 - 12.9 fL    Neutrophils-Polys 70.70 44.00 - 72.00 %    Lymphocytes 20.40 (L) 22.00 - 41.00 %    Monocytes 6.40 0.00 - 13.40 %    Eosinophils 0.40 0.00 - 6.90 %    Basophils 0.20 0.00 - 1.80 %    Immature Granulocytes 1.90 (H) 0.00 - 0.90 %    Nucleated RBC 0.00 /100 WBC    Neutrophils (Absolute) 6.53 2.00 - 7.15 K/uL    Lymphs (Absolute) 1.89 1.00 - 4.80 K/uL    Monos (Absolute) 0.59 0.00 - 0.85 K/uL    Eos (Absolute) 0.04 0.00 - 0.51 K/uL    Baso (Absolute) 0.02 0.00 - 0.12 K/uL    Immature Granulocytes (abs) 0.18 (H) 0.00 - 0.11 K/uL    NRBC (Absolute) 0.00 K/uL   COD (ADULT)    Collection Time: 18  5:05 AM   Result Value Ref Range    ABO Grouping Only A     Rh Grouping Only POS     Antibody Screen-Cod NEG          Assessment:    Pregnancy @ 33W2D  PPROM   x3  Undesired fertility  Breech presentation      Plan:  Continue current antepartum care  Repeat  and BTL planned for 34 wks (or earlier if indicated)  Pt will finish antibiotics today  Plan of care reviewed        Luiz Forrester M.D.

## 2018-08-09 NOTE — PROGRESS NOTES
Good day pt feeling better iv heplocked and pt able to shower and bedding changed family now in with pt visiting will give report to noc shift at 1900 pt settled for the night care of pt. Cont.

## 2018-08-09 NOTE — PROGRESS NOTES
"Lou Vergara   33w1d  Admission DX: Pregnancy  Labor and delivery, indication for care    Date of Admission: 8/2/2018  There are no active problems to display for this patient.      Subjective:   uterine contractions:no but feeling some very mild lower abdomen cramping occas  pain: .no  LOF: yes  vaginal Bleeding: pink fluid leaking  fetal movement: normal    Objective:   Vitals:    08/08/18 1100 08/08/18 1200 08/08/18 1549 08/08/18 1924   BP:   105/57 122/63   Pulse:   90 97   Resp:    18   Temp:  36.3 °C (97.4 °F)  36.7 °C (98 °F)   TempSrc:       SpO2:       Weight: 73.9 kg (163 lb)      Height: 1.6 m (5' 2.99\")        FHR: 150s, pos accels, neg decels, moderate variability  Gen: NAD, comfortable  Membranes: ruptured: yes  Abdomen: gravid, soft, NT  Ext: SCDs on, Nt, no cyanosis or clubbing    Meds:     Current Facility-Administered Medications:   •  ferrous gluconate (FERGON) tablet 324 mg, 324 mg, Oral, BID, Cheli Santacruz M.D., 324 mg at 08/08/18 1805  •  polyethylene glycol/lytes (MIRALAX) PACKET 1 Packet, 1 Packet, Oral, DAILY, Julee Quezada M.D., Stopped at 08/08/18 0800  •  magnesium hydroxide (MILK OF MAGNESIA) suspension 30 mL, 30 mL, Oral, 4X/DAY PRN, Julee Quezada M.D., 30 mL at 08/05/18 0916  •  amoxicillin (AMOXIL) capsule 500 mg, 500 mg, Oral, Q8HRS, Errol Camara M.D., 500 mg at 08/08/18 1419  •  ondansetron (ZOFRAN) syringe/vial injection 4 mg, 4 mg, Intravenous, Q6HRS PRN, Martina Marquez M.D.    Labs:    Lab:   Recent Results (from the past 72 hour(s))   ABO AND RH CONFIRMATION    Collection Time: 08/06/18 12:14 PM   Result Value Ref Range    ABO Confirm A     Second Rh Group POS    CBC WITH DIFFERENTIAL    Collection Time: 08/06/18 12:16 PM   Result Value Ref Range    WBC 9.3 4.8 - 10.8 K/uL    RBC 3.49 (L) 4.20 - 5.40 M/uL    Hemoglobin 10.0 (L) 12.0 - 16.0 g/dL    Hematocrit 30.6 (L) 37.0 - 47.0 %    MCV 87.7 81.4 - 97.8 fL    MCH 28.7 27.0 - 33.0 pg    MCHC 32.7 (L) " 33.6 - 35.0 g/dL    RDW 46.1 35.9 - 50.0 fL    Platelet Count 253 164 - 446 K/uL    MPV 10.0 9.0 - 12.9 fL    Neutrophils-Polys 70.70 44.00 - 72.00 %    Lymphocytes 20.40 (L) 22.00 - 41.00 %    Monocytes 6.40 0.00 - 13.40 %    Eosinophils 0.40 0.00 - 6.90 %    Basophils 0.20 0.00 - 1.80 %    Immature Granulocytes 1.90 (H) 0.00 - 0.90 %    Nucleated RBC 0.00 /100 WBC    Neutrophils (Absolute) 6.53 2.00 - 7.15 K/uL    Lymphs (Absolute) 1.89 1.00 - 4.80 K/uL    Monos (Absolute) 0.59 0.00 - 0.85 K/uL    Eos (Absolute) 0.04 0.00 - 0.51 K/uL    Baso (Absolute) 0.02 0.00 - 0.12 K/uL    Immature Granulocytes (abs) 0.18 (H) 0.00 - 0.11 K/uL    NRBC (Absolute) 0.00 K/uL   COD (ADULT)    Collection Time: 18  5:05 AM   Result Value Ref Range    ABO Grouping Only A     Rh Grouping Only POS     Antibody Screen-Cod NEG        Assessment:  32 y.o.  @ 33w1d  PPROM  Breech  S/p BMZ x 2  Prior  x 3  Desires sterilization  Cat 1 FHR    Plan:  Cont antepartum mgmt  Bed rest w/ bathroom privileges  CFM/toco  Delivery at 34 wks or earlier w/ signs of chorio/labor/distress

## 2018-08-09 NOTE — CARE PLAN
Problem: Infection  Goal: Will remain free from infection    Intervention: Assess signs and symptoms of infection  Temp done as needed cont to be WNL

## 2018-08-09 NOTE — CARE PLAN
Problem: Risk for Infection, Impaired Wound Healing  Goal: Remain free from signs and symptoms of infection  Outcome: PROGRESSING AS EXPECTED  Patient afebrile, no signs and symptoms of infection    Problem: Risk for injury  Goal: Fetus will be free of preventable trauma or other complications    Intervention: Monitor FHR electronically, obtaining a reactive NST every shift or as ordered by MD  Continuous fetal monitoring, EFM in place, strip continuously being monitored and reviewed.

## 2018-08-10 PROCEDURE — A9270 NON-COVERED ITEM OR SERVICE: HCPCS | Performed by: OBSTETRICS & GYNECOLOGY

## 2018-08-10 PROCEDURE — 302790 HCHG STAT ANTEPARTUM CARE, DAILY

## 2018-08-10 PROCEDURE — 770002 HCHG ROOM/CARE - OB PRIVATE (112)

## 2018-08-10 PROCEDURE — 700102 HCHG RX REV CODE 250 W/ 637 OVERRIDE(OP): Performed by: OBSTETRICS & GYNECOLOGY

## 2018-08-10 RX ADMIN — POLYETHYLENE GLYCOL 3350 1 PACKET: 17 POWDER, FOR SOLUTION ORAL at 08:34

## 2018-08-10 RX ADMIN — FERROUS GLUCONATE 324 MG: 324 TABLET ORAL at 20:00

## 2018-08-10 RX ADMIN — FERROUS GLUCONATE 324 MG: 324 TABLET ORAL at 08:33

## 2018-08-10 ASSESSMENT — PATIENT HEALTH QUESTIONNAIRE - PHQ9
1. LITTLE INTEREST OR PLEASURE IN DOING THINGS: NOT AT ALL
SUM OF ALL RESPONSES TO PHQ9 QUESTIONS 1 AND 2: 0
2. FEELING DOWN, DEPRESSED, IRRITABLE, OR HOPELESS: NOT AT ALL

## 2018-08-10 NOTE — PROGRESS NOTES
EDC -  EGA - 33.3    0700 - Report received from JEAN Bruce RN. POC discussed, care assumed.   07 -  Full Assessment complete. VSS. No complaints of contractions, or vaginal bleeding at this time. Pt denies fever, chills, abd tenderness. Pt reports good FM. POC discussed with pt, all questions answered.   0834 - Lactation at bedside to discuss plans for pumping after delivery. Questions answered.  1100 - pt resting comfortably in bed. Declines feeling ctx's at this time. No other needs.  1232 - pt sitting up in bed. No needs at this time  1430 - Dr. Bush at bedside. C/S scheduled for 18 @1400. Pt to be NPO that morning at 0600.  1714 - pt resting in bed. VS WNL.  1755 - Dr. Bush notified of previous decel during the day. No new orders.  1900 - Report given to CK Saul RN. POC discussed.

## 2018-08-10 NOTE — CARE PLAN
Problem: Discharge Barriers/Planning  Goal: Patient's Continuum of care needs are met    Intervention: Involve patient/significant other in discharge process  Working with OR  to arrange C/S      Problem: Risk for injury  Goal: Patient and fetus will be free of preventable injury/complications    Intervention: Monitor Fetal well being  FHT's monitored continuously throughout labor

## 2018-08-10 NOTE — PROGRESS NOTES
1850: Report received from CK Guzmán RN. Plan of care discussed.   1855: RN at bedside due to patient deceleration. MD reviews patient strip at this time. No new orders placed. Will continue to monitor.   0700: Report given to day RN. Plan of care discussed.

## 2018-08-10 NOTE — PROGRESS NOTES
Lou Vergara   33w3d  Admission DX: Pregnancy  Labor and delivery, indication for care    Date of Admission: 2018  There are no active problems to display for this patient.      Subjective:   uterine contractions:no  pain: .no  LOF: yes  vaginal Bleeding: no  fetal movement: normal    Objective:   Vitals:    08/10/18 0158 08/10/18 0346 08/10/18 0600 08/10/18 0727   BP:  105/58  111/67   Pulse:  76  90   Resp:  18  18   Temp: 36.1 °C (97 °F) 35.9 °C (96.6 °F) 36.4 °C (97.5 °F) 36.4 °C (97.6 °F)   TempSrc:    Temporal   SpO2:       Weight:       Height:         FHR: 150s, pos accels, occas 10 second variable decels, moderate variability, overall reassuring, reactive  Felt: no Ctx  Gen: NAD, comfortable  Membranes: ruptured: yes  Abdomen: gravid, soft, NT, no rebound or guarding  Ext: nontender, no edema, no cyanosis or clubbing    Meds:     Current Facility-Administered Medications:   •  ferrous gluconate (FERGON) tablet 324 mg, 324 mg, Oral, BID, Cheli Santacruz M.D., 324 mg at 08/10/18 0833  •  polyethylene glycol/lytes (MIRALAX) PACKET 1 Packet, 1 Packet, Oral, DAILY, Julee Quezada M.D., 1 Packet at 08/10/18 0834  •  magnesium hydroxide (MILK OF MAGNESIA) suspension 30 mL, 30 mL, Oral, 4X/DAY PRN, Julee Quezada M.D., 30 mL at 18 0916  •  ondansetron (ZOFRAN) syringe/vial injection 4 mg, 4 mg, Intravenous, Q6HRS PRN, Martina Marquez M.D.    Labs:    Lab:   Recent Results (from the past 72 hour(s))   COD (ADULT)    Collection Time: 18  5:05 AM   Result Value Ref Range    ABO Grouping Only A     Rh Grouping Only POS     Antibody Screen-Cod NEG        Assessment:  32 y.o.  @ 33w3d  PPROM 18  Breech  S/p BMZ x 2  Prior  x 3  Desires sterilization  Cat 1 FHR    Plan:  Cont antepartum management  CFM/toco  Repeat  section scheduled on 18 at 2pm  Deliver sooner with signs of chorio/labor/fetal distress

## 2018-08-10 NOTE — CARE PLAN
Problem: Safety  Goal: Will remain free from falls  Outcome: PROGRESSING AS EXPECTED  Patient resting in bed with call light in reach. Bed in low position. Progressing as expected.     Problem: Infection  Goal: Will remain free from infection  Outcome: PROGRESSING AS EXPECTED  No S/S of infection noted at this time. Will continue to monitor. Progressing as expected.

## 2018-08-10 NOTE — CONSULTS
"Lactation visit done. Patient does desire to breastfeed. Discussed importance of pumping in first hour after delivery, while still in PACU to maximize milk production capability. Given handouts on \"Maximizing Milk Production\"and AWOHHN Late .  Discussed followup options and how to rent a HG pump prior to d/c. Lactation to follow post delivery or per patient request.   "

## 2018-08-11 PROCEDURE — A9270 NON-COVERED ITEM OR SERVICE: HCPCS | Performed by: OBSTETRICS & GYNECOLOGY

## 2018-08-11 PROCEDURE — 700102 HCHG RX REV CODE 250 W/ 637 OVERRIDE(OP): Performed by: OBSTETRICS & GYNECOLOGY

## 2018-08-11 PROCEDURE — 302790 HCHG STAT ANTEPARTUM CARE, DAILY

## 2018-08-11 PROCEDURE — 770002 HCHG ROOM/CARE - OB PRIVATE (112)

## 2018-08-11 RX ADMIN — POLYETHYLENE GLYCOL 3350 1 PACKET: 17 POWDER, FOR SOLUTION ORAL at 08:18

## 2018-08-11 RX ADMIN — FERROUS GLUCONATE 324 MG: 324 TABLET ORAL at 19:57

## 2018-08-11 RX ADMIN — FERROUS GLUCONATE 324 MG: 324 TABLET ORAL at 08:18

## 2018-08-11 RX ADMIN — MAGNESIUM HYDROXIDE 30 ML: 400 SUSPENSION ORAL at 14:52

## 2018-08-11 ASSESSMENT — PAIN SCALES - GENERAL
PAINLEVEL_OUTOF10: 0

## 2018-08-11 NOTE — PROGRESS NOTES
Report received and plan of care discussed.  Patient awake with family @ bedside for support.  Patient states she is feeling fine, afebrile, no c/o uterine tenderness, no foul smell from amniotic fluid, which remains slightly pink tinged.  States baby has been moving.

## 2018-08-11 NOTE — PROGRESS NOTES
0700 Report rcvd from CARLOS Lerma. POC discussed, pt care assumed. Pt found to be resting comfortably. No needs at this time. Will return to do assessment.  0820 Assessment done. Denies change in fluid. No bleeding. Pt reports some cramping but no painful UCs. Pt eating breakfast. Water given. No needs at this time.   1300 Pts family at bedside.   1500 IV dressing changed and IV flushed. Pt tolerated well.   1700 Pt eating dinner.   1900 Report to CARLOS Chun.

## 2018-08-11 NOTE — CARE PLAN
Problem: Risk for Infection, Impaired Wound Healing  Goal: Remain free from signs and symptoms of infection  Outcome: PROGRESSING AS EXPECTED  Patient afebrile, no c/o uterine tenderness, and  No foul smell from amniotic fluid.

## 2018-08-11 NOTE — CARE PLAN
Problem: Pain  Goal: Alleviation of Pain or a reduction in pain to the patient's comfort goal  Outcome: PROGRESSING AS EXPECTED  Patient has no c/o pain or discomfort at this time.

## 2018-08-12 PROBLEM — O42.919 PRETERM PREMATURE RUPTURE OF MEMBRANES (PPROM) WITH UNKNOWN ONSET OF LABOR: Status: ACTIVE | Noted: 2018-08-12

## 2018-08-12 PROCEDURE — 770002 HCHG ROOM/CARE - OB PRIVATE (112)

## 2018-08-12 PROCEDURE — A9270 NON-COVERED ITEM OR SERVICE: HCPCS | Performed by: OBSTETRICS & GYNECOLOGY

## 2018-08-12 PROCEDURE — 302790 HCHG STAT ANTEPARTUM CARE, DAILY

## 2018-08-12 PROCEDURE — 700102 HCHG RX REV CODE 250 W/ 637 OVERRIDE(OP): Performed by: OBSTETRICS & GYNECOLOGY

## 2018-08-12 RX ADMIN — MAGNESIUM HYDROXIDE 30 ML: 400 SUSPENSION ORAL at 16:23

## 2018-08-12 RX ADMIN — FERROUS GLUCONATE 324 MG: 324 TABLET ORAL at 07:25

## 2018-08-12 RX ADMIN — POLYETHYLENE GLYCOL 3350 1 PACKET: 17 POWDER, FOR SOLUTION ORAL at 07:25

## 2018-08-12 ASSESSMENT — PAIN SCALES - GENERAL
PAINLEVEL_OUTOF10: 0

## 2018-08-12 ASSESSMENT — PATIENT HEALTH QUESTIONNAIRE - PHQ9
SUM OF ALL RESPONSES TO PHQ9 QUESTIONS 1 AND 2: 0
2. FEELING DOWN, DEPRESSED, IRRITABLE, OR HOPELESS: NOT AT ALL
1. LITTLE INTEREST OR PLEASURE IN DOING THINGS: NOT AT ALL

## 2018-08-12 NOTE — CARE PLAN
Problem: Infection  Goal: Will remain free from infection  Outcome: PROGRESSING AS EXPECTED  VSS. Afebrile. [place]-tinged discharge w/ no odor. Will continue to monitor.     Problem: Pain Management  Goal: Pain level will decrease to patient's comfort goal  Outcome: PROGRESSING AS EXPECTED  Pt declines interventions at this time. Pt states occasional mild cramp and denies pain.

## 2018-08-12 NOTE — CARE PLAN
Problem: Powerlessness  Goal: Patient will verbalize increased feelings of control or understanding  Outcome: PROGRESSING AS EXPECTED  Pt encouraged to participate in her POC and discuss concerns/needs with healthcare professionals.    Problem: Infection  Goal: Will remain free from infection  Outcome: PROGRESSING AS EXPECTED  Pt instructed on hand washing and hand  stations at the door. Pt will be monitored for s/s of infection during her stay.

## 2018-08-12 NOTE — CARE PLAN
Problem: Pain  Goal: Alleviation of Pain or a reduction in pain to the patient's comfort goal    Intervention: Initial pain assessment  Pt states slight cramping at times, educated to let RN if cramping worsens, will continue to monitor at this time      Problem: Infection  Goal: Will remain free from infection    Intervention: Implement standard precautions and perform hand washing before and after patient contact  Hand hygiene performed appropriately education provided.

## 2018-08-12 NOTE — PROGRESS NOTES
Lou Vergara   33w5d  Admission DX: Pregnancy,  premature of membranes  Labor and delivery, indication for care    Date of Admission: 2018  Patient Active Problem List    Diagnosis Date Noted   •  premature rupture of membranes (PPROM)  2018       Subjective:   uterine contractions:rare  pain: .no  LOF: no  vaginal Bleeding: occasional pinkish discharge  fetal movement: normal    Objective:   Vitals:    18 0153 18 0341 18 0600 18 0730   BP:  101/55  104/58   Pulse:  71  86   Resp:  17     Temp: 36.1 °C (96.9 °F) 35.8 °C (96.5 °F) 36 °C (96.8 °F) 36.2 °C (97.2 °F)   TempSrc:       SpO2:       Weight:       Height:           Gen:   Abdomen: gravid, soft, NT  Ext: SCDs on, Nt, no cyanosis or clubbing    FHTs: 140/mod annita/+accels; 1 late appearing decel (no associated ctx on toco) @ 0300      Meds:     Current Facility-Administered Medications:   •  ferrous gluconate (FERGON) tablet 324 mg, 324 mg, Oral, BID, Cheli Santacruz M.D., 324 mg at 18 0725  •  polyethylene glycol/lytes (MIRALAX) PACKET 1 Packet, 1 Packet, Oral, DAILY, Julee Quezada M.D., 1 Packet at 18 0725  •  magnesium hydroxide (MILK OF MAGNESIA) suspension 30 mL, 30 mL, Oral, 4X/DAY PRN, Julee Quezada M.D., 30 mL at 18 1452  •  ondansetron (ZOFRAN) syringe/vial injection 4 mg, 4 mg, Intravenous, Q6HRS PRN, Martina Marquez M.D.      A/P: 32 y.o.  @ 33w5d w/ PPROM, hx of c/s x3  - PPROM, admitted  @ 32w2d, s/p latency abx and here for expectant management until 34wks or earlier if maternal or fetal indication develops.  S/p BMZ -8/3  - breech on last US; for repeat c/s w/ BTL  - fetal monitoring: currently on continuous with small breaks occasionally   FHTs reactive, currently reassuring   BPP    Growth US  1995g (35% [place])  - GBS: +  - desires BTL w/ repeat c/s.  Insurance pending, currently self pay so was told she cannot get tubal.  Discussed  that cost may not be much on top of the c/s itself, will try to find out cost (should be re-imbursable once insurance back dates coverage but pt does not want to take on too much risk if cost is significant - if cost too high for out of pocket coverage, would plan for vasectomy for )    dispo: cont inpatient monitoring    Medina Schneider MD  [facility], Women's Health

## 2018-08-12 NOTE — PROGRESS NOTES
1900: report received from Matt GONZALEZ. POC dicussed care assumed. Pt states slight cramping at this time. Dr. Marquez  aware.   0700: report given to Amanda GONZALEZ.

## 2018-08-12 NOTE — PROGRESS NOTES
"0700 Report received from CK Callejas RN.     0730 Pt states having occasional \"mild cramping\" but according to pt this has been her baseline.     0915 Dr Russell at bedside. MD educated pt on POC and scheduled C/S.     1620 Pt given Milk of Mag for constipation.    1740 FHT w/ decel to 80s. RN walked in room and pt laying on R side. Pt leaned back and FHT back to 130s.     1900 Report given to CARLOS Sears.     "

## 2018-08-12 NOTE — PROGRESS NOTES
Lou Vergara   33w4d    Subjective: Rare contractions.  Continues to leak pink fluid.  Fetus active.  No fevers or foul vaginal discharge.     Objective:   Vitals:    18 1127 18 1400 18 1602 18 1800   BP: 125/75  114/65    Pulse: 97  88    Resp: 18  18    Temp: 36.3 °C (97.4 °F) 36.3 °C (97.3 °F) 36 °C (96.8 °F) 36.1 °C (97 °F)   TempSrc:       SpO2:       Weight:       Height:         General - alert, in NAD.   ABD - gravid, NT  EXT - no edema, calves NT    [place] - no contractions  EFM - Cat 1 tracing    Lab: No results found for this or any previous visit (from the past 48 hour(s)).    Assessment:   31 yo  @33w4d admitted with PPROM.  S/P BMZ x 2, Latency abx.  Stable, without evidence of IAI.  Hx of previous C/S x 3, breech presentation    P.   Continue current management.  Plan for repeat C/S at 34 wks, or earlier delivery if evidence of IAI or fetal distress.

## 2018-08-13 LAB
ABO GROUP BLD: NORMAL
BLD GP AB SCN SERPL QL: NORMAL
RH BLD: NORMAL

## 2018-08-13 PROCEDURE — 700105 HCHG RX REV CODE 258

## 2018-08-13 PROCEDURE — 700102 HCHG RX REV CODE 250 W/ 637 OVERRIDE(OP): Performed by: OBSTETRICS & GYNECOLOGY

## 2018-08-13 PROCEDURE — 700105 HCHG RX REV CODE 258: Performed by: OBSTETRICS & GYNECOLOGY

## 2018-08-13 PROCEDURE — 302790 HCHG STAT ANTEPARTUM CARE, DAILY

## 2018-08-13 PROCEDURE — 36415 COLL VENOUS BLD VENIPUNCTURE: CPT

## 2018-08-13 PROCEDURE — A9270 NON-COVERED ITEM OR SERVICE: HCPCS | Performed by: OBSTETRICS & GYNECOLOGY

## 2018-08-13 PROCEDURE — 86900 BLOOD TYPING SEROLOGIC ABO: CPT

## 2018-08-13 PROCEDURE — 86901 BLOOD TYPING SEROLOGIC RH(D): CPT

## 2018-08-13 PROCEDURE — 770002 HCHG ROOM/CARE - OB PRIVATE (112)

## 2018-08-13 PROCEDURE — 86850 RBC ANTIBODY SCREEN: CPT

## 2018-08-13 RX ORDER — SODIUM CHLORIDE, SODIUM LACTATE, POTASSIUM CHLORIDE, CALCIUM CHLORIDE 600; 310; 30; 20 MG/100ML; MG/100ML; MG/100ML; MG/100ML
INJECTION, SOLUTION INTRAVENOUS CONTINUOUS
Status: DISCONTINUED | OUTPATIENT
Start: 2018-08-13 | End: 2018-08-17 | Stop reason: HOSPADM

## 2018-08-13 RX ORDER — SODIUM CHLORIDE, SODIUM LACTATE, POTASSIUM CHLORIDE, CALCIUM CHLORIDE 600; 310; 30; 20 MG/100ML; MG/100ML; MG/100ML; MG/100ML
INJECTION, SOLUTION INTRAVENOUS
Status: COMPLETED
Start: 2018-08-13 | End: 2018-08-13

## 2018-08-13 RX ADMIN — SODIUM CHLORIDE, POTASSIUM CHLORIDE, SODIUM LACTATE AND CALCIUM CHLORIDE 500 ML: 600; 310; 30; 20 INJECTION, SOLUTION INTRAVENOUS at 06:32

## 2018-08-13 RX ADMIN — SODIUM CHLORIDE, POTASSIUM CHLORIDE, SODIUM LACTATE AND CALCIUM CHLORIDE: 600; 310; 30; 20 INJECTION, SOLUTION INTRAVENOUS at 11:01

## 2018-08-13 RX ADMIN — FERROUS GLUCONATE 324 MG: 324 TABLET ORAL at 07:40

## 2018-08-13 RX ADMIN — POLYETHYLENE GLYCOL 3350 1 PACKET: 17 POWDER, FOR SOLUTION ORAL at 07:40

## 2018-08-13 ASSESSMENT — PATIENT HEALTH QUESTIONNAIRE - PHQ9
2. FEELING DOWN, DEPRESSED, IRRITABLE, OR HOPELESS: NOT AT ALL
1. LITTLE INTEREST OR PLEASURE IN DOING THINGS: NOT AT ALL
SUM OF ALL RESPONSES TO PHQ9 QUESTIONS 1 AND 2: 0
2. FEELING DOWN, DEPRESSED, IRRITABLE, OR HOPELESS: NOT AT ALL
1. LITTLE INTEREST OR PLEASURE IN DOING THINGS: NOT AT ALL
SUM OF ALL RESPONSES TO PHQ9 QUESTIONS 1 AND 2: 0

## 2018-08-13 NOTE — CARE PLAN
Problem: Pain  Goal: Alleviation of Pain or a reduction in pain to the patient's comfort goal  Outcome: PROGRESSING AS EXPECTED  Pt denies pain, no cramping/ctx, will continue to assess    Problem: Infection  Goal: Will remain free from infection  Outcome: PROGRESSING AS EXPECTED  PPROM, afebrile, no abdominal tenderness, no other s/s infection

## 2018-08-13 NOTE — PROGRESS NOTES
Antepartum Progress Note    Name:   Lou Vergara   Date/Time:  2018 10:13 AM  Gestational Age:  33w6d  Admit Date:   2018    Admitting Dx:      Pregnancy @ 32W2D  PPROM  Prior c-sections x 3  Undesired fertility  Mal presenting fetus  Hx of PTD      Subjective: Pt sitting up in bed without complaints.   Uterine contractions: no  Pain:    no  Complaints:   no   Headache: .  no  Blurred vision:  no   SOB:    no   Nausea/vomiting:  no  Epigastric pain:  no  Fetal movement:  normal  Vaginal bleeding: . no    Objective:   Vitals:    18 2113 18 2114 18 0018 18 0743   BP:  127/69 (!) 94/54 105/55   Pulse:  87 78 86   Resp: 16   16   Temp: 36.1 °C (97 °F)  36.2 °C (97.1 °F) 36.6 °C (97.8 °F)   TempSrc: Temporal  Temporal Temporal   SpO2:       Weight:       Height:           Exam:  Pt is AAOx3, NAD  VSS, AF  HEENT: NC/AT  Neck Supple  Chest: CTA  CVS: RRR  ABD: Soft, gravid NT. Uterine fundus NT  EXT: No c/c/e or calf pain    Fetal heart variability: moderate with accelerations and no decelerations. No contractions on tocometer. FHR category 1    Fetal position:   Breech  Membranes ruptured: yes    Meds:     Current Facility-Administered Medications:   •  lactated ringers infusion, , Intravenous, Continuous, Medina Schneider M.D., Last Rate: 2,000 mL/hr at 18 0632, 500 mL at 18 0632  •  ferrous gluconate (FERGON) tablet 324 mg, 324 mg, Oral, BID, Cheli Santacruz M.D., 324 mg at 18 0740  •  polyethylene glycol/lytes (MIRALAX) PACKET 1 Packet, 1 Packet, Oral, DAILY, Julee Quezada M.D., 1 Packet at 18 0740  •  magnesium hydroxide (MILK OF MAGNESIA) suspension 30 mL, 30 mL, Oral, 4X/DAY PRN, Julee Quezada M.D., 30 mL at 18 1623  •  ondansetron (ZOFRAN) syringe/vial injection 4 mg, 4 mg, Intravenous, Q6HRS PRN, Martina Marquez M.D.    Labs:  No results found for this or any previous visit (from the past 72 hour(s)).      Assessment:    Gestational Age: 33w6d  PPROM  Prior c-sections x 3  Undesired fertility  Mal presenting fetus  Hx of PTD  FHR tracing reassuring  S/P 7 days of antibiotics and Magnesium sulfate x 48 hours  S/P Betamethasone x 2 doses    Plan: Continue present management  Pt scheduled for repeat  and BTL tomorrow  Plan of care reviewed    Patient Active Problem List    Diagnosis Date Noted   •  premature rupture of membranes (PPROM)  2018       Luiz Forrester M.D.

## 2018-08-13 NOTE — PROGRESS NOTES
1900- Report from ZULEYKA Ram RN     To bedside, pt continues to report clear amniotic fluid, no abdominal tenderness, denies cramping/ctx, reports baby moving normally    0630- Dr. Russell reviewed tracing including deceleration at 0603. Orders for LR bolus and continuous IV fluids    0700- Report to ZULEYKA Spence RN

## 2018-08-13 NOTE — CARE PLAN
Problem: Risk for Infection, Impaired Wound Healing  Goal: Remain free from signs and symptoms of infection    Intervention: Instruct Patient regarding signs and symptoms of infection  Watching for s/s of infection due to PPROM      Problem: Pain  Goal: Alleviation of Pain or a reduction in pain to the patient's comfort goal  Pt is pain free at this time

## 2018-08-13 NOTE — PROGRESS NOTES
0700- Report received from Renu RN- poc discussed  0745- pt resting no c/o pain, bleeding, small amount of clear fluid out, +FM, morning meds given pt excited she will be delivered tomorrow afternoon  1200- pt resting no complaints  1500- order received to saline lock IV and restart fluids when NPO at 0600 tomorrow  1630- pt resting no complaints  1900- Report given to JEAN Bruce RN- poc discussed

## 2018-08-14 LAB
BASOPHILS # BLD AUTO: 0.2 % (ref 0–1.8)
BASOPHILS # BLD: 0.02 K/UL (ref 0–0.12)
EOSINOPHIL # BLD AUTO: 0.07 K/UL (ref 0–0.51)
EOSINOPHIL NFR BLD: 0.7 % (ref 0–6.9)
ERYTHROCYTE [DISTWIDTH] IN BLOOD BY AUTOMATED COUNT: 49.1 FL (ref 35.9–50)
HCT VFR BLD AUTO: 33 % (ref 37–47)
HGB BLD-MCNC: 10.7 G/DL (ref 12–16)
IMM GRANULOCYTES # BLD AUTO: 0.18 K/UL (ref 0–0.11)
IMM GRANULOCYTES NFR BLD AUTO: 1.9 % (ref 0–0.9)
LYMPHOCYTES # BLD AUTO: 1.99 K/UL (ref 1–4.8)
LYMPHOCYTES NFR BLD: 20.6 % (ref 22–41)
MCH RBC QN AUTO: 28.8 PG (ref 27–33)
MCHC RBC AUTO-ENTMCNC: 32.4 G/DL (ref 33.6–35)
MCV RBC AUTO: 88.9 FL (ref 81.4–97.8)
MONOCYTES # BLD AUTO: 0.55 K/UL (ref 0–0.85)
MONOCYTES NFR BLD AUTO: 5.7 % (ref 0–13.4)
NEUTROPHILS # BLD AUTO: 6.86 K/UL (ref 2–7.15)
NEUTROPHILS NFR BLD: 70.9 % (ref 44–72)
NRBC # BLD AUTO: 0 K/UL
NRBC BLD-RTO: 0 /100 WBC
PLATELET # BLD AUTO: 278 K/UL (ref 164–446)
PMV BLD AUTO: 10.1 FL (ref 9–12.9)
RBC # BLD AUTO: 3.71 M/UL (ref 4.2–5.4)
WBC # BLD AUTO: 9.7 K/UL (ref 4.8–10.8)

## 2018-08-14 PROCEDURE — A9270 NON-COVERED ITEM OR SERVICE: HCPCS | Performed by: OBSTETRICS & GYNECOLOGY

## 2018-08-14 PROCEDURE — 304966 HCHG RECOVERY SVSC TIME ADDL 1/2 HR: Performed by: OBSTETRICS & GYNECOLOGY

## 2018-08-14 PROCEDURE — 700102 HCHG RX REV CODE 250 W/ 637 OVERRIDE(OP): Performed by: OBSTETRICS & GYNECOLOGY

## 2018-08-14 PROCEDURE — 0UL70CZ OCCLUSION OF BILATERAL FALLOPIAN TUBES WITH EXTRALUMINAL DEVICE, OPEN APPROACH: ICD-10-PCS | Performed by: OBSTETRICS & GYNECOLOGY

## 2018-08-14 PROCEDURE — 770002 HCHG ROOM/CARE - OB PRIVATE (112)

## 2018-08-14 PROCEDURE — 306828 HCHG ANES-TIME GENERAL: Performed by: OBSTETRICS & GYNECOLOGY

## 2018-08-14 PROCEDURE — A9270 NON-COVERED ITEM OR SERVICE: HCPCS | Performed by: ANESTHESIOLOGY

## 2018-08-14 PROCEDURE — 700105 HCHG RX REV CODE 258

## 2018-08-14 PROCEDURE — 59514 CESAREAN DELIVERY ONLY: CPT

## 2018-08-14 PROCEDURE — 700111 HCHG RX REV CODE 636 W/ 250 OVERRIDE (IP)

## 2018-08-14 PROCEDURE — 700102 HCHG RX REV CODE 250 W/ 637 OVERRIDE(OP): Performed by: ANESTHESIOLOGY

## 2018-08-14 PROCEDURE — 304964 HCHG RECOVERY ROOM TIME 1HR: Performed by: OBSTETRICS & GYNECOLOGY

## 2018-08-14 PROCEDURE — 85025 COMPLETE CBC W/AUTO DIFF WBC: CPT

## 2018-08-14 PROCEDURE — 36415 COLL VENOUS BLD VENIPUNCTURE: CPT

## 2018-08-14 PROCEDURE — 700112 HCHG RX REV CODE 229: Performed by: OBSTETRICS & GYNECOLOGY

## 2018-08-14 PROCEDURE — 700101 HCHG RX REV CODE 250

## 2018-08-14 PROCEDURE — 305385 HCHG SURGICAL SERVICES 1/4 HOUR: Performed by: OBSTETRICS & GYNECOLOGY

## 2018-08-14 PROCEDURE — 700111 HCHG RX REV CODE 636 W/ 250 OVERRIDE (IP): Performed by: ANESTHESIOLOGY

## 2018-08-14 PROCEDURE — 700111 HCHG RX REV CODE 636 W/ 250 OVERRIDE (IP): Performed by: OBSTETRICS & GYNECOLOGY

## 2018-08-14 RX ORDER — HYDROMORPHONE HYDROCHLORIDE 2 MG/ML
0.2 INJECTION, SOLUTION INTRAMUSCULAR; INTRAVENOUS; SUBCUTANEOUS
Status: ACTIVE | OUTPATIENT
Start: 2018-08-14 | End: 2018-08-15

## 2018-08-14 RX ORDER — OXYCODONE HYDROCHLORIDE AND ACETAMINOPHEN 5; 325 MG/1; MG/1
2 TABLET ORAL EVERY 4 HOURS PRN
Status: ACTIVE | OUTPATIENT
Start: 2018-08-14 | End: 2018-08-15

## 2018-08-14 RX ORDER — SIMETHICONE 80 MG
80 TABLET,CHEWABLE ORAL 4 TIMES DAILY PRN
Status: DISCONTINUED | OUTPATIENT
Start: 2018-08-14 | End: 2018-08-17 | Stop reason: HOSPADM

## 2018-08-14 RX ORDER — SODIUM CHLORIDE, SODIUM LACTATE, POTASSIUM CHLORIDE, CALCIUM CHLORIDE 600; 310; 30; 20 MG/100ML; MG/100ML; MG/100ML; MG/100ML
INJECTION, SOLUTION INTRAVENOUS CONTINUOUS
Status: DISCONTINUED | OUTPATIENT
Start: 2018-08-14 | End: 2018-08-14 | Stop reason: HOSPADM

## 2018-08-14 RX ORDER — DIPHENHYDRAMINE HYDROCHLORIDE 50 MG/ML
25 INJECTION INTRAMUSCULAR; INTRAVENOUS EVERY 6 HOURS PRN
Status: DISCONTINUED | OUTPATIENT
Start: 2018-08-14 | End: 2018-08-15

## 2018-08-14 RX ORDER — MISOPROSTOL 200 UG/1
800 TABLET ORAL
Status: DISCONTINUED | OUTPATIENT
Start: 2018-08-14 | End: 2018-08-14 | Stop reason: HOSPADM

## 2018-08-14 RX ORDER — OXYCODONE HYDROCHLORIDE AND ACETAMINOPHEN 5; 325 MG/1; MG/1
1 TABLET ORAL EVERY 4 HOURS PRN
Status: DISPENSED | OUTPATIENT
Start: 2018-08-14 | End: 2018-08-15

## 2018-08-14 RX ORDER — ACETAMINOPHEN 325 MG/1
325 TABLET ORAL EVERY 4 HOURS PRN
Status: DISCONTINUED | OUTPATIENT
Start: 2018-08-14 | End: 2018-08-17 | Stop reason: HOSPADM

## 2018-08-14 RX ORDER — METOCLOPRAMIDE HYDROCHLORIDE 5 MG/ML
10 INJECTION INTRAMUSCULAR; INTRAVENOUS ONCE
Status: COMPLETED | OUTPATIENT
Start: 2018-08-14 | End: 2018-08-14

## 2018-08-14 RX ORDER — METOCLOPRAMIDE HYDROCHLORIDE 5 MG/ML
10 INJECTION INTRAMUSCULAR; INTRAVENOUS EVERY 6 HOURS PRN
Status: DISCONTINUED | OUTPATIENT
Start: 2018-08-14 | End: 2018-08-15

## 2018-08-14 RX ORDER — SODIUM CHLORIDE, SODIUM GLUCONATE, SODIUM ACETATE, POTASSIUM CHLORIDE AND MAGNESIUM CHLORIDE 526; 502; 368; 37; 30 MG/100ML; MG/100ML; MG/100ML; MG/100ML; MG/100ML
INJECTION, SOLUTION INTRAVENOUS
Status: COMPLETED
Start: 2018-08-14 | End: 2018-08-14

## 2018-08-14 RX ORDER — VITAMIN A ACETATE, BETA CAROTENE, ASCORBIC ACID, CHOLECALCIFEROL, .ALPHA.-TOCOPHEROL ACETATE, DL-, THIAMINE MONONITRATE, RIBOFLAVIN, NIACINAMIDE, PYRIDOXINE HYDROCHLORIDE, FOLIC ACID, CYANOCOBALAMIN, CALCIUM CARBONATE, FERROUS FUMARATE, ZINC OXIDE, CUPRIC OXIDE 3080; 12; 120; 400; 1; 1.84; 3; 20; 22; 920; 25; 200; 27; 10; 2 [IU]/1; UG/1; MG/1; [IU]/1; MG/1; MG/1; MG/1; MG/1; MG/1; [IU]/1; MG/1; MG/1; MG/1; MG/1; MG/1
1 TABLET, FILM COATED ORAL EVERY MORNING
Status: DISCONTINUED | OUTPATIENT
Start: 2018-08-14 | End: 2018-08-17 | Stop reason: HOSPADM

## 2018-08-14 RX ORDER — DOCUSATE SODIUM 100 MG/1
100 CAPSULE, LIQUID FILLED ORAL 2 TIMES DAILY PRN
Status: DISCONTINUED | OUTPATIENT
Start: 2018-08-14 | End: 2018-08-17 | Stop reason: HOSPADM

## 2018-08-14 RX ORDER — DIPHENHYDRAMINE HYDROCHLORIDE 50 MG/ML
12.5 INJECTION INTRAMUSCULAR; INTRAVENOUS EVERY 6 HOURS PRN
Status: DISCONTINUED | OUTPATIENT
Start: 2018-08-14 | End: 2018-08-15

## 2018-08-14 RX ORDER — SODIUM CHLORIDE, SODIUM GLUCONATE, SODIUM ACETATE, POTASSIUM CHLORIDE AND MAGNESIUM CHLORIDE 526; 502; 368; 37; 30 MG/100ML; MG/100ML; MG/100ML; MG/100ML; MG/100ML
1500 INJECTION, SOLUTION INTRAVENOUS ONCE
Status: COMPLETED | OUTPATIENT
Start: 2018-08-14 | End: 2018-08-14

## 2018-08-14 RX ORDER — ONDANSETRON 2 MG/ML
4 INJECTION INTRAMUSCULAR; INTRAVENOUS EVERY 6 HOURS PRN
Status: DISCONTINUED | OUTPATIENT
Start: 2018-08-14 | End: 2018-08-15

## 2018-08-14 RX ORDER — ACETAMINOPHEN 500 MG
1000 TABLET ORAL ONCE
Status: COMPLETED | OUTPATIENT
Start: 2018-08-14 | End: 2018-08-14

## 2018-08-14 RX ORDER — METHYLERGONOVINE MALEATE 0.2 MG/ML
0.2 INJECTION INTRAVENOUS
Status: DISCONTINUED | OUTPATIENT
Start: 2018-08-14 | End: 2018-08-14 | Stop reason: HOSPADM

## 2018-08-14 RX ORDER — CEFAZOLIN SODIUM 1 G/3ML
1 INJECTION, POWDER, FOR SOLUTION INTRAMUSCULAR; INTRAVENOUS ONCE
Status: DISCONTINUED | OUTPATIENT
Start: 2018-08-14 | End: 2018-08-14 | Stop reason: HOSPADM

## 2018-08-14 RX ORDER — SODIUM CHLORIDE, SODIUM LACTATE, POTASSIUM CHLORIDE, CALCIUM CHLORIDE 600; 310; 30; 20 MG/100ML; MG/100ML; MG/100ML; MG/100ML
INJECTION, SOLUTION INTRAVENOUS PRN
Status: DISCONTINUED | OUTPATIENT
Start: 2018-08-14 | End: 2018-08-17 | Stop reason: HOSPADM

## 2018-08-14 RX ORDER — MISOPROSTOL 200 UG/1
600 TABLET ORAL
Status: DISCONTINUED | OUTPATIENT
Start: 2018-08-14 | End: 2018-08-17 | Stop reason: HOSPADM

## 2018-08-14 RX ORDER — KETOROLAC TROMETHAMINE 30 MG/ML
30 INJECTION, SOLUTION INTRAMUSCULAR; INTRAVENOUS EVERY 6 HOURS
Status: DISCONTINUED | OUTPATIENT
Start: 2018-08-14 | End: 2018-08-15

## 2018-08-14 RX ORDER — METHYLERGONOVINE MALEATE 0.2 MG/ML
0.2 INJECTION INTRAVENOUS
Status: DISCONTINUED | OUTPATIENT
Start: 2018-08-14 | End: 2018-08-17 | Stop reason: HOSPADM

## 2018-08-14 RX ORDER — NALOXONE HYDROCHLORIDE 0.4 MG/ML
0.1 INJECTION, SOLUTION INTRAMUSCULAR; INTRAVENOUS; SUBCUTANEOUS PRN
Status: DISCONTINUED | OUTPATIENT
Start: 2018-08-14 | End: 2018-08-15

## 2018-08-14 RX ORDER — CITRIC ACID/SODIUM CITRATE 334-500MG
30 SOLUTION, ORAL ORAL ONCE
Status: COMPLETED | OUTPATIENT
Start: 2018-08-14 | End: 2018-08-14

## 2018-08-14 RX ADMIN — KETOROLAC TROMETHAMINE 30 MG: 30 INJECTION, SOLUTION INTRAMUSCULAR at 17:59

## 2018-08-14 RX ADMIN — Medication 1 TABLET: at 14:33

## 2018-08-14 RX ADMIN — OXYTOCIN 125 ML/HR: 10 INJECTION, SOLUTION INTRAMUSCULAR; INTRAVENOUS at 13:23

## 2018-08-14 RX ADMIN — OXYCODONE AND ACETAMINOPHEN 1 TABLET: 5; 325 TABLET ORAL at 14:36

## 2018-08-14 RX ADMIN — SODIUM CHLORIDE, SODIUM GLUCONATE, SODIUM ACETATE, POTASSIUM CHLORIDE AND MAGNESIUM CHLORIDE: 526; 502; 368; 37; 30 INJECTION, SOLUTION INTRAVENOUS at 10:51

## 2018-08-14 RX ADMIN — FERROUS GLUCONATE 324 MG: 324 TABLET ORAL at 21:49

## 2018-08-14 RX ADMIN — DOCUSATE SODIUM 100 MG: 100 CAPSULE, LIQUID FILLED ORAL at 21:53

## 2018-08-14 RX ADMIN — SODIUM CITRATE AND CITRIC ACID MONOHYDRATE 30 ML: 500; 334 SOLUTION ORAL at 11:00

## 2018-08-14 RX ADMIN — OXYCODONE AND ACETAMINOPHEN 1 TABLET: 5; 325 TABLET ORAL at 21:49

## 2018-08-14 RX ADMIN — METOCLOPRAMIDE 10 MG: 5 INJECTION, SOLUTION INTRAMUSCULAR; INTRAVENOUS at 11:01

## 2018-08-14 RX ADMIN — FAMOTIDINE 20 MG: 10 INJECTION, SOLUTION INTRAVENOUS at 11:01

## 2018-08-14 RX ADMIN — ACETAMINOPHEN 1000 MG: 500 TABLET, FILM COATED ORAL at 13:10

## 2018-08-14 ASSESSMENT — PATIENT HEALTH QUESTIONNAIRE - PHQ9
2. FEELING DOWN, DEPRESSED, IRRITABLE, OR HOPELESS: NOT AT ALL
SUM OF ALL RESPONSES TO PHQ9 QUESTIONS 1 AND 2: 0
1. LITTLE INTEREST OR PLEASURE IN DOING THINGS: NOT AT ALL

## 2018-08-14 ASSESSMENT — PAIN SCALES - GENERAL
PAINLEVEL_OUTOF10: 0
PAINLEVEL_OUTOF10: 3
PAINLEVEL_OUTOF10: 0
PAINLEVEL_OUTOF10: 0
PAINLEVEL_OUTOF10: 6
PAINLEVEL_OUTOF10: 6
PAINLEVEL_OUTOF10: 0
PAINLEVEL_OUTOF10: 5

## 2018-08-14 NOTE — CONSULTS
Mom has a history of NICU admissions and last baby went on to breastfeed over a year.  She has ordered a personal pump by [employer].  She wants to breastfeed this baby and is anxious to see baby.    Pumping initiated at 80CPM, increased suction to 30% with no pain. May increase suction to comfort.  Less than 1ml obtained, in syringe, labeled for NICU.    Discussed pumping routine, seeing baby, medications mom is on for post C/S being compatible with breastfeeding and expectation for initiating breastfeeding in the NICU.  Written instructions provided.

## 2018-08-14 NOTE — PROGRESS NOTES
"1900: Report received from RICHA Spence RN. Plan of care discussed.   1930: Dr. Camara on telephone at this time. MD states to make patient NPO starting at midnight. MD states C/S will be rescheduled to 0930 on 8/14 instead of 1400.   2000: Patient updated on new scheduled section time. Patient and FOB state \"that sounds great!\" All questions answered at this time.   0700: Report given to day RN. Plan of care discussed.   "

## 2018-08-15 LAB
ERYTHROCYTE [DISTWIDTH] IN BLOOD BY AUTOMATED COUNT: 49.1 FL (ref 35.9–50)
HCT VFR BLD AUTO: 28.2 % (ref 37–47)
HGB BLD-MCNC: 9.1 G/DL (ref 12–16)
MCH RBC QN AUTO: 28.6 PG (ref 27–33)
MCHC RBC AUTO-ENTMCNC: 32.3 G/DL (ref 33.6–35)
MCV RBC AUTO: 88.7 FL (ref 81.4–97.8)
PLATELET # BLD AUTO: 261 K/UL (ref 164–446)
PMV BLD AUTO: 10.4 FL (ref 9–12.9)
RBC # BLD AUTO: 3.18 M/UL (ref 4.2–5.4)
WBC # BLD AUTO: 15.5 K/UL (ref 4.8–10.8)

## 2018-08-15 PROCEDURE — 700111 HCHG RX REV CODE 636 W/ 250 OVERRIDE (IP): Performed by: ANESTHESIOLOGY

## 2018-08-15 PROCEDURE — 700102 HCHG RX REV CODE 250 W/ 637 OVERRIDE(OP): Performed by: OBSTETRICS & GYNECOLOGY

## 2018-08-15 PROCEDURE — A9270 NON-COVERED ITEM OR SERVICE: HCPCS | Performed by: ANESTHESIOLOGY

## 2018-08-15 PROCEDURE — A9270 NON-COVERED ITEM OR SERVICE: HCPCS | Performed by: OBSTETRICS & GYNECOLOGY

## 2018-08-15 PROCEDURE — 85027 COMPLETE CBC AUTOMATED: CPT

## 2018-08-15 PROCEDURE — 700102 HCHG RX REV CODE 250 W/ 637 OVERRIDE(OP): Performed by: ANESTHESIOLOGY

## 2018-08-15 PROCEDURE — 770002 HCHG ROOM/CARE - OB PRIVATE (112)

## 2018-08-15 PROCEDURE — 36415 COLL VENOUS BLD VENIPUNCTURE: CPT

## 2018-08-15 PROCEDURE — 700112 HCHG RX REV CODE 229: Performed by: OBSTETRICS & GYNECOLOGY

## 2018-08-15 RX ORDER — FERROUS SULFATE 325(65) MG
325 TABLET ORAL 2 TIMES DAILY WITH MEALS
Status: DISCONTINUED | OUTPATIENT
Start: 2018-08-15 | End: 2018-08-15

## 2018-08-15 RX ORDER — OXYCODONE HYDROCHLORIDE 10 MG/1
10 TABLET ORAL EVERY 4 HOURS PRN
Status: DISCONTINUED | OUTPATIENT
Start: 2018-08-15 | End: 2018-08-17 | Stop reason: HOSPADM

## 2018-08-15 RX ORDER — ONDANSETRON 4 MG/1
4 TABLET, ORALLY DISINTEGRATING ORAL EVERY 6 HOURS PRN
Status: DISCONTINUED | OUTPATIENT
Start: 2018-08-15 | End: 2018-08-17 | Stop reason: HOSPADM

## 2018-08-15 RX ORDER — ONDANSETRON 2 MG/ML
4 INJECTION INTRAMUSCULAR; INTRAVENOUS EVERY 6 HOURS PRN
Status: DISCONTINUED | OUTPATIENT
Start: 2018-08-15 | End: 2018-08-17 | Stop reason: HOSPADM

## 2018-08-15 RX ORDER — IBUPROFEN 600 MG/1
600 TABLET ORAL EVERY 6 HOURS PRN
Status: DISCONTINUED | OUTPATIENT
Start: 2018-08-15 | End: 2018-08-17 | Stop reason: HOSPADM

## 2018-08-15 RX ORDER — OXYCODONE HYDROCHLORIDE AND ACETAMINOPHEN 5; 325 MG/1; MG/1
1 TABLET ORAL EVERY 4 HOURS PRN
Status: DISCONTINUED | OUTPATIENT
Start: 2018-08-15 | End: 2018-08-17 | Stop reason: HOSPADM

## 2018-08-15 RX ADMIN — DOCUSATE SODIUM 100 MG: 100 CAPSULE, LIQUID FILLED ORAL at 23:35

## 2018-08-15 RX ADMIN — SIMETHICONE CHEW TAB 80 MG 80 MG: 80 TABLET ORAL at 08:10

## 2018-08-15 RX ADMIN — IBUPROFEN 600 MG: 600 TABLET, FILM COATED ORAL at 01:51

## 2018-08-15 RX ADMIN — IBUPROFEN 600 MG: 600 TABLET, FILM COATED ORAL at 15:14

## 2018-08-15 RX ADMIN — IBUPROFEN 600 MG: 600 TABLET, FILM COATED ORAL at 08:10

## 2018-08-15 RX ADMIN — POLYETHYLENE GLYCOL 3350 1 PACKET: 17 POWDER, FOR SOLUTION ORAL at 08:12

## 2018-08-15 RX ADMIN — OXYCODONE HYDROCHLORIDE 10 MG: 10 TABLET ORAL at 23:42

## 2018-08-15 RX ADMIN — Medication 1 TABLET: at 08:11

## 2018-08-15 RX ADMIN — FERROUS GLUCONATE 324 MG: 324 TABLET ORAL at 08:11

## 2018-08-15 RX ADMIN — SIMETHICONE CHEW TAB 80 MG 80 MG: 80 TABLET ORAL at 15:14

## 2018-08-15 RX ADMIN — OXYCODONE AND ACETAMINOPHEN 1 TABLET: 5; 325 TABLET ORAL at 17:39

## 2018-08-15 RX ADMIN — IBUPROFEN 600 MG: 600 TABLET, FILM COATED ORAL at 23:35

## 2018-08-15 RX ADMIN — SIMETHICONE CHEW TAB 80 MG 80 MG: 80 TABLET ORAL at 23:35

## 2018-08-15 RX ADMIN — FERROUS GLUCONATE 324 MG: 324 TABLET ORAL at 23:42

## 2018-08-15 RX ADMIN — OXYCODONE AND ACETAMINOPHEN 1 TABLET: 5; 325 TABLET ORAL at 15:14

## 2018-08-15 ASSESSMENT — PAIN SCALES - GENERAL
PAINLEVEL_OUTOF10: 8
PAINLEVEL_OUTOF10: 2
PAINLEVEL_OUTOF10: 4
PAINLEVEL_OUTOF10: 5

## 2018-08-15 NOTE — PROGRESS NOTES
Post Partum Progress Note    Name:   Lou Vergara   Date/Time:  8/15/2018 - 5:23 AM  Chief Admitting Dx:  Pregnancy  Labor and delivery, indication for care  Delivery Type:   for repeat  Post-Op/Post Partum Days #:  1    Subjective:  Abdominal pain: no  Ambulating:   yes  Tolerating liquids:  yes  Tolerating food:  yes common adult  Flatus:   yes  BM:    no  Bleeding:   without any bleeding  Voiding:   yes  Dizziness:   no  Feeding:   breast    Vitals:    18 2300 08/15/18 0000 08/15/18 0200 08/15/18 0400   BP:  106/70  110/69   Pulse: 70 68 60 65   Resp: 18 18 16 17   Temp:  36.4 °C (97.5 °F)  37.1 °C (98.8 °F)   TempSrc:       SpO2: 98% 97% 96% 98%   Weight:       Height:           Exam:  Breast: Tenderness no, Engorged no and Lactating yes  Abdomen: Abdomen soft, non-tender. BS normal. No masses,  No organomegaly  Fundal Tenderness:  no  Fundus Firm: yes  Incision: dry and intact  Below umbilicus: yes  Perineum: perineum intact  Lochia: mild  Extremities: Normal, no cyanosis, clubbing, trace extremities, peripheral pulses and reflexes normal, no edema, redness or tenderness in the calves or thighs, Homans sign is negative, no sign of DVT, feet normal, good pulses, normal color, temperature and sensation    Meds:  Current Facility-Administered Medications   Medication Dose   • ibuprofen (MOTRIN) tablet 600 mg  600 mg   • ferrous sulfate tablet 325 mg  325 mg   • oxytocin (PITOCIN) infusion (for postpartum)  2,000 mL/hr    Followed by   • oxytocin (PITOCIN) infusion (for postpartum)   mL/hr   • LR infusion     • PRN oxytocin (PITOCIN) (20 Units/1000 mL) PRN for excessive uterine bleeding - See Admin Instr  125-999 mL/hr   • miSOPROStol (CYTOTEC) tablet 600 mcg  600 mcg   • methylergonovine (METHERGINE) injection 0.2 mg  0.2 mg   • docusate sodium (COLACE) capsule 100 mg  100 mg   • simethicone (MYLICON) chewable tab 80 mg  80 mg   • prenatal plus vitamin (STUARTNATAL 1+1) 27-1 MG tablet  1 Tab  1 Tab   • acetaminophen (TYLENOL) tablet 325 mg  325 mg   • naloxone (NARCAN) injection 0.1 mg  0.1 mg   • oxyCODONE-acetaminophen (PERCOCET) 5-325 MG per tablet 1 Tab  1 Tab   • oxyCODONE-acetaminophen (PERCOCET) 5-325 MG per tablet 2 Tab  2 Tab   • HYDROmorphone (DILAUDID) injection 0.2 mg  0.2 mg   • ePHEDrine injection 10 mg  10 mg   • ondansetron (ZOFRAN) syringe/vial injection 4 mg  4 mg   • metoclopramide (REGLAN) injection 10 mg  10 mg   • naloxone (NARCAN) 0.4 mg in NS 1,000 mL infusion  0.4 mg   • diphenhydrAMINE (BENADRYL) injection 12.5 mg  12.5 mg   • diphenhydrAMINE (BENADRYL) injection 25 mg  25 mg   • naloxone (NARCAN) 0.4 mg in NS 1,000 mL infusion  0.4 mg   • lactated ringers infusion     • ferrous gluconate (FERGON) tablet 324 mg  324 mg   • polyethylene glycol/lytes (MIRALAX) PACKET 1 Packet  1 Packet   • magnesium hydroxide (MILK OF MAGNESIA) suspension 30 mL  30 mL       Labs:   Recent Labs      18   0621  08/15/18   0314   WBC  9.7  15.5*   RBC  3.71*  3.18*   HEMOGLOBIN  10.7*  9.1*   HEMATOCRIT  33.0*  28.2*   MCV  88.9  88.7   MCH  28.8  28.6   MCHC  32.4*  32.3*   RDW  49.1  49.1   PLATELETCT  278  261   MPV  10.1  10.4       Assessment:  Chief Admitting Dx:  Pregnancy  Labor and delivery, indication for care  Delivery Type:   for repeat  Tubal Ligation:  yes    Plan:  Continue routine post partum care.  On iron for anemia  Continue lactation support for baby in ICN  Anticipate discharge POD#3    PAMELLA [email or web address].

## 2018-08-15 NOTE — PROGRESS NOTES
Assessment completed. WDL. Vital signs stable. Patient progressing according to plan of care. Patient up and ambulating. Pt states she is voiding without difficulty. Patient claims to have good pain relief with prn pain medications. Pt states she will call when she needs prn pain medication. Pt denies any other issues at this time.  Pt encouraged to call for any needs.

## 2018-08-16 PROCEDURE — A9270 NON-COVERED ITEM OR SERVICE: HCPCS | Performed by: OBSTETRICS & GYNECOLOGY

## 2018-08-16 PROCEDURE — 700102 HCHG RX REV CODE 250 W/ 637 OVERRIDE(OP): Performed by: OBSTETRICS & GYNECOLOGY

## 2018-08-16 PROCEDURE — 700112 HCHG RX REV CODE 229: Performed by: OBSTETRICS & GYNECOLOGY

## 2018-08-16 PROCEDURE — 770002 HCHG ROOM/CARE - OB PRIVATE (112)

## 2018-08-16 RX ADMIN — FERROUS GLUCONATE 324 MG: 324 TABLET ORAL at 21:31

## 2018-08-16 RX ADMIN — DOCUSATE SODIUM 100 MG: 100 CAPSULE, LIQUID FILLED ORAL at 21:37

## 2018-08-16 RX ADMIN — IBUPROFEN 600 MG: 600 TABLET, FILM COATED ORAL at 06:00

## 2018-08-16 RX ADMIN — SIMETHICONE CHEW TAB 80 MG 80 MG: 80 TABLET ORAL at 06:01

## 2018-08-16 RX ADMIN — OXYCODONE HYDROCHLORIDE 10 MG: 10 TABLET ORAL at 16:42

## 2018-08-16 RX ADMIN — FERROUS GLUCONATE 324 MG: 324 TABLET ORAL at 10:32

## 2018-08-16 RX ADMIN — POLYETHYLENE GLYCOL 3350 1 PACKET: 17 POWDER, FOR SOLUTION ORAL at 10:32

## 2018-08-16 RX ADMIN — IBUPROFEN 600 MG: 600 TABLET, FILM COATED ORAL at 11:31

## 2018-08-16 RX ADMIN — OXYCODONE HYDROCHLORIDE 10 MG: 10 TABLET ORAL at 11:31

## 2018-08-16 RX ADMIN — OXYCODONE HYDROCHLORIDE 10 MG: 10 TABLET ORAL at 21:32

## 2018-08-16 RX ADMIN — OXYCODONE HYDROCHLORIDE 10 MG: 10 TABLET ORAL at 03:38

## 2018-08-16 RX ADMIN — Medication 1 TABLET: at 06:00

## 2018-08-16 ASSESSMENT — PAIN SCALES - GENERAL
PAINLEVEL_OUTOF10: 4
PAINLEVEL_OUTOF10: 5
PAINLEVEL_OUTOF10: 0
PAINLEVEL_OUTOF10: 8
PAINLEVEL_OUTOF10: 0

## 2018-08-16 NOTE — PROGRESS NOTES
Post Partum Progress Note    Name:   Lou Vergara   Date/Time:  2018 - 5:41 AM  Chief Admitting Dx:  Pregnancy  Labor and delivery, indication for care  Delivery Type:   for repeat and PPROM  Post-Op/Post Partum Days #:  2    Subjective:  Abdominal pain: no  Ambulating:   yes  Tolerating liquids:  yes  Tolerating food:  yes common adult  Flatus:   Patient states flatus only x 1 yesterday.   BM:    no  Bleeding:   without any bleeding  Voiding:   yes  Dizziness:   no  Feeding:   Breast pump with baby in NICU    Vitals:    08/15/18 0800 08/15/18 1200 08/15/18 1600 08/15/18 2230   BP: (!) 98/73 100/70 (!) 98/69 103/67   Pulse: 84 85 80 78   Resp:    Temp: 36.5 °C (97.7 °F) 37 °C (98.6 °F) 36.6 °C (97.9 °F) 36.4 °C (97.6 °F)   TempSrc:       SpO2: 95% 97% 96% 95%   Weight:       Height:           Exam:  Breast: Tenderness no, Engorged no and Lactating yes pump baby in NICU  Abdomen: Abdomen soft, non-tender. BS normal. No masses,  No organomegaly  Fundal Tenderness:  no  Fundus Firm: yes  Incision: dry and intact  Below umbilicus: yes  Perineum: perineum intact  Lochia: mild  Extremities: Normal extremities, peripheral pulses and reflexes normal    Meds:  Current Facility-Administered Medications   Medication Dose   • ibuprofen (MOTRIN) tablet 600 mg  600 mg   • oxyCODONE-acetaminophen (PERCOCET) 5-325 MG per tablet 1 Tab  1 Tab   • oxyCODONE immediate release (ROXICODONE) tablet 10 mg  10 mg   • ondansetron (ZOFRAN) syringe/vial injection 4 mg  4 mg    Or   • ondansetron (ZOFRAN ODT) dispertab 4 mg  4 mg   • oxytocin (PITOCIN) infusion (for postpartum)   mL/hr   • LR infusion     • PRN oxytocin (PITOCIN) (20 Units/1000 mL) PRN for excessive uterine bleeding - See Admin Instr  125-999 mL/hr   • miSOPROStol (CYTOTEC) tablet 600 mcg  600 mcg   • methylergonovine (METHERGINE) injection 0.2 mg  0.2 mg   • docusate sodium (COLACE) capsule 100 mg  100 mg   • simethicone (MYLICON)  chewable tab 80 mg  80 mg   • prenatal plus vitamin (STUARTNATAL 1+1) 27-1 MG tablet 1 Tab  1 Tab   • acetaminophen (TYLENOL) tablet 325 mg  325 mg   • lactated ringers infusion     • ferrous gluconate (FERGON) tablet 324 mg  324 mg   • polyethylene glycol/lytes (MIRALAX) PACKET 1 Packet  1 Packet   • magnesium hydroxide (MILK OF MAGNESIA) suspension 30 mL  30 mL       Labs:   Recent Labs      18   0621  08/15/18   0314   WBC  9.7  15.5*   RBC  3.71*  3.18*   HEMOGLOBIN  10.7*  9.1*   HEMATOCRIT  33.0*  28.2*   MCV  88.9  88.7   MCH  28.8  28.6   MCHC  32.4*  32.3*   RDW  49.1  49.1   PLATELETCT  278  261   MPV  10.1  10.4       Assessment:  Chief Admitting Dx:  Pregnancy  Labor and delivery, indication for care  Delivery Type:   for repeat and PPROM  Tubal Ligation:  yes    Plan:  Continue routine post partum care.  Patient has very hypoactive bowel sounds. States flatus only once yesterday. RN has given her stool softener and simethicone. Encourage ambulation. If not flatus today notify MD.     ANTHONY Ch.N.P.

## 2018-08-16 NOTE — PROGRESS NOTES
Assessment complete, VSS, fundus firm, lochia light. Incision approximated with staples and open to air. Pt voiding without difficulty. Pumping 8-12x per day with hospital grade pump, visiting infant in NICU frequently. Pt states pain is being well controlled and will call for PRN pain meds as needed. POC discussed with pt and family, questions answered, call light within reach.

## 2018-08-16 NOTE — PROGRESS NOTES
1900: Bedside report received, assumed care of pt.     1945: Pt visiting infant in NICU, educated pt to call RN when she returns for assessment.

## 2018-08-16 NOTE — CARE PLAN
Problem: Medication  Goal: Compliance with prescribed medication will improve    Intervention: Educate patient and significant other/support system medication rationale and regimen  Educated patient to continue with prn pain medication. Patient declines pain medication upon assessment. Patient continues to pump and walk to NICU for bonding and feeding.Fob in room at change of shift. Patient re-educated to pump every 3 hours.

## 2018-08-16 NOTE — PROGRESS NOTES
Lactation Note:    Attempted to meet with MOB, but MOB in NICU visiting infant.  Will attempt to meet with MOB at a later time.

## 2018-08-16 NOTE — PROGRESS NOTES
Lactation Note:    Made contact with MOB when down in NICU during rounds.  MOB stated she continues to pump as instructed, but has begun using her [place] hospital grade breast pump because she was not receiving any colostrum when pumping with [employer] hospital grade breast pump belonging to PP unit.  MOB stated she is pumping 3.5 ml from both breasts combined with her hospital grade breast pump.  MOB denied pain with pumping.      MOB made aware that she will have access to a hospital grade breast pump when she is in NICU visiting infant.  MOB instructed to take all pump parts home with her so that she can use them with the NICU hospital grade breast pump.    MOB made aware that she will be provided with latch assistance once infant is able to be put to breast to suckle and/or feed.    MOB verbalized understanding of all information provided to her and denied having any further questions at this time.  MOB encouraged to call for assistance with pumping and/or breastfeeding as needed.

## 2018-08-17 VITALS
WEIGHT: 163 LBS | HEIGHT: 63 IN | DIASTOLIC BLOOD PRESSURE: 59 MMHG | BODY MASS INDEX: 28.88 KG/M2 | TEMPERATURE: 98.2 F | HEART RATE: 83 BPM | OXYGEN SATURATION: 99 % | SYSTOLIC BLOOD PRESSURE: 108 MMHG | RESPIRATION RATE: 18 BRPM

## 2018-08-17 PROCEDURE — 700102 HCHG RX REV CODE 250 W/ 637 OVERRIDE(OP): Performed by: OBSTETRICS & GYNECOLOGY

## 2018-08-17 PROCEDURE — A9270 NON-COVERED ITEM OR SERVICE: HCPCS | Performed by: OBSTETRICS & GYNECOLOGY

## 2018-08-17 PROCEDURE — 0HQ7XZZ REPAIR ABDOMEN SKIN, EXTERNAL APPROACH: ICD-10-PCS | Performed by: OBSTETRICS & GYNECOLOGY

## 2018-08-17 RX ORDER — OXYCODONE HYDROCHLORIDE AND ACETAMINOPHEN 5; 325 MG/1; MG/1
1 TABLET ORAL EVERY 4 HOURS PRN
Qty: 30 TAB | Refills: 0 | Status: SHIPPED | OUTPATIENT
Start: 2018-08-17 | End: 2018-08-24

## 2018-08-17 RX ADMIN — OXYCODONE HYDROCHLORIDE 10 MG: 10 TABLET ORAL at 09:56

## 2018-08-17 RX ADMIN — OXYCODONE HYDROCHLORIDE 10 MG: 10 TABLET ORAL at 05:41

## 2018-08-17 RX ADMIN — POLYETHYLENE GLYCOL 3350 1 PACKET: 17 POWDER, FOR SOLUTION ORAL at 08:00

## 2018-08-17 RX ADMIN — IBUPROFEN 600 MG: 600 TABLET, FILM COATED ORAL at 09:56

## 2018-08-17 RX ADMIN — FERROUS GLUCONATE 324 MG: 324 TABLET ORAL at 09:55

## 2018-08-17 RX ADMIN — SIMETHICONE CHEW TAB 80 MG 80 MG: 80 TABLET ORAL at 09:56

## 2018-08-17 RX ADMIN — IBUPROFEN 600 MG: 600 TABLET, FILM COATED ORAL at 01:38

## 2018-08-17 RX ADMIN — OXYCODONE HYDROCHLORIDE 10 MG: 10 TABLET ORAL at 14:00

## 2018-08-17 RX ADMIN — Medication 1 TABLET: at 05:40

## 2018-08-17 RX ADMIN — OXYCODONE HYDROCHLORIDE AND ACETAMINOPHEN 1 TABLET: 5; 325 TABLET ORAL at 01:38

## 2018-08-17 ASSESSMENT — PAIN SCALES - GENERAL
PAINLEVEL_OUTOF10: 0
PAINLEVEL_OUTOF10: 4
PAINLEVEL_OUTOF10: 4
PAINLEVEL_OUTOF10: 5
PAINLEVEL_OUTOF10: 7
PAINLEVEL_OUTOF10: 0
PAINLEVEL_OUTOF10: 0
PAINLEVEL_OUTOF10: 7
PAINLEVEL_OUTOF10: 0

## 2018-08-17 NOTE — PROGRESS NOTES
Pt states she has breast pump for home. Pt ambulating to NICU to visit with infant. Pt will call nurse when back in room for staple removal. Pt states miralax was given by night nurse before change of shift.

## 2018-08-17 NOTE — PROGRESS NOTES
31 yo female POD#3 following repeat c section for PPROM     Called by RN to evaluate wound after staple removal. Upon evaluation of the incision there was a 2 cm area of wound dehiscence.  The rest of the wound was clean dry and intact with steri strips in place.  The are was cleaned with alcohol and 2 cc of lidocaine were injected into the skin edges.  The area was cleaned with betadine.  The edges were reapproximated and 3 staples were placed to close the wound.  The patient will be seen in one week at the [facility] for an incision check and staple removal.

## 2018-08-17 NOTE — DISCHARGE INSTRUCTIONS
POSTPARTUM DISCHARGE INSTRUCTIONS FOR MOM    YOB: 1985   Age: 32 y.o.               Admit Date: 2018     Discharge Date: 2018  Attending Doctor:  Martina Marquez M.D.                  Allergies:  Patient has no known allergies.    Discharged to home by car. Discharged via wheelchair, hospital escort: Yes.  Special equipment needed: Not Applicable  Belongings with: Personal  Be sure to schedule a follow-up appointment with your primary care doctor or any specialists as instructed.     Discharge Plan:   Influenza Vaccine Indication: Indicated: Not available from distributor/    REASONS TO CALL YOUR OBSTETRICIAN:  1.   Persistent fever or shaking chills (Temperature higher than 100.4)  2.   Heavy bleeding (soaking more than 1 pad per hour); Passing clots  3.   Foul odor from vagina  4.   Mastitis (Breast infection; breast pain, chills, fever, redness)  5.   Urinary pain, burning or frequency  6.   Episiotomy infection  7.   Abdominal incision infection  8.   Severe depression longer than 24 hours    HAND WASHING  · Prior to handling the baby.  · Before breastfeeding or bottle feeding baby.  · After using the bathroom or changing the baby's diaper.    WOUND CARE  Ask your physician for additional care instructions.  In general:    ·  Incision:      · Keep clean and dry.    · Do NOT lift anything heavier than your baby for up to 6 weeks.    · There should not be any opening or pus.      VAGINAL CARE  · Nothing inside vagina for 6 weeks: no sexual intercourse, tampons or douching.  · Bleeding may continue for 2-4 weeks.  Amount may vary.    · Call your physician for heavy bleeding which means soaking more than 1 pad per hour    BIRTH CONTROL  · It is possible to become pregnant at any time after delivery and while breastfeeding.  · Plan to discuss a method of birth control with your physician at your follow up visit. visit.    DIET AND ELIMINATION  · Eating more fiber (bran cereal,  "fruits, and vegetables) and drinking plenty of fluids will help to avoid constipation.  · Urinary frequency after childbirth is normal.    POSTPARTUM BLUES  During the first few days after birth, you may experience a sense of the \"blues\" which may include impatience, irritability or even crying.  These feeling come and go quickly.  However, as many as 1 in 10 women experience emotional symptoms known as postpartum depression.    Postpartum depression:  May start as early as the second or third day after delivery or take several weeks or months to develop.  Symptoms of \"blues\" are present, but are more intense:  Crying spells; loss of appetite; feelings of hopelessness or loss of control; fear of touching the baby; over concern or no concern at all about the baby; little or no concern about your own appearance/caring for yourself; and/or inability to sleep or excessive sleeping.  Contact your physician if you are experiencing any of these symptoms.    Crisis Hotline:  · [place] Crisis Hotline:  7-281-ZROBMDJ  Or 1-263.792.4257  · [place] Crisis Hotline:  1-243.740.6029  Or 223-384-9746    PREVENTING SHAKEN BABY:  If you are angry or stressed, PUT THE BABY IN THE CRIB, step away, take some deep breaths, and wait until you are calm to care for the baby.  DO NOT SHAKE THE BABY.  You are not alone, call a supporter for help.    · Crisis Call Center 24/7 crisis line 999-838-5824 or 1-111.867.5016  · You can also text them, text \"ANSWER\" to 948865    QUIT SMOKING/TOBACCO USE:  I understand the use of any tobacco products increases my chance of suffering from future heart disease and could cause other illnesses which may shorten my life. Quitting the use of tobacco products is the single most important thing I can do to improve my health. For further information on smoking / tobacco cessation call a Toll Free Quit Line at 1-308.565.4234 (*[employer] [place]) or 1-119.294.8906 (American Lung Association) or you can " access the web based program at www.lungusa.org.    · [place] Tobacco Users Help Line:  (561) 547-2758       Toll Free: 1-210.713.4385  · Quit Tobacco Program [facility] (206)036-6467    DEPRESSION / SUICIDE RISK:  As you are discharged from this [facility], it is important to learn how to keep safe from harming yourself.    Recognize the warning signs:  · Abrupt changes in personality, positive or negative- including increase in energy   · Giving away possessions  · Change in eating patterns- significant weight changes-  positive or negative  · Change in sleeping patterns- unable to sleep or sleeping all the time   · Unwillingness or inability to communicate  · Depression  · Unusual sadness, discouragement and loneliness  · Talk of wanting to die  · Neglect of personal appearance   · Rebelliousness- reckless behavior  · Withdrawal from people/activities they love  · Confusion- inability to concentrate     If you or a loved one observes any of these behaviors or has concerns about self-harm, here's what you can do:  · Talk about it- your feelings and reasons for harming yourself  · Remove any means that you might use to hurt yourself (examples: pills, rope, extension cords, firearm)  · Get professional help from the community (Mental Health, Substance Abuse, psychological counseling)  · Do not be alone:Call your Safe Contact- someone whom you trust who will be there for you.  · Call your local CRISIS HOTLINE 915-4282 or 846-975-5575  · Call your local Children's Mobile Crisis Response Team [place] (963) 522-7673 or www.MentorCloud  · Call the toll free National Suicide Prevention Hotlines   · National Suicide Prevention Lifeline 568-220-XFSW (0775)  · National Hope Line Network 800-SUICIDE (632-0564)    DISCHARGE SURVEY:  Thank you for choosing [facility].  We hope we provided you with very good care.  You may be receiving a survey in the mail.  Please fill it out.   Your opinion is valuable to us.    ADDITIONAL EDUCATIONAL MATERIALS GIVEN TO PATIENT:        My signature on this form indicates that:  1.  I have reviewed and understand the above information  2.  My questions regarding this information have been answered to my satisfaction.  3.  I have formulated a plan with my discharge nurse to obtain my prescribed medication for home.

## 2018-08-17 NOTE — PROGRESS NOTES
Pt will call pregnancy center on Monday to schedule for appointment for staple removal. Pt discharged from hospital unit. Pt ambulated to NICU.

## 2018-08-17 NOTE — DISCHARGE SUMMARY
Discharge Summary:      Lou Vergara      Admit Date:   2018  Discharge Date:  2018     Admitting diagnosis:  Pregnancy  Labor and delivery, indication for care  Discharge Diagnosis: Status post  for repeat.  Pregnancy Complications: PPROM  Tubal Ligation:  yes        History:  Past Medical History:   Diagnosis Date   • TMJ arthritis      OB History    Para Term  AB Living   4 3 2 1   3   SAB TAB Ectopic Molar Multiple Live Births                    # Outcome Date GA Lbr Aki/2nd Weight Sex Delivery Anes PTL Lv   4 Current            3             2 Term            1 Term                    Patient has no known allergies.  Patient Active Problem List    Diagnosis Date Noted   •  premature rupture of membranes (PPROM)  2018        Hospital Course:   32 y.o. , now para 4, was admitted with the above mentioned diagnosis, underwent Repeat  repeat,  for repeat. Patient postpartum course was unremarkable, with progressive advancement in diet , ambulation and toleration of oral analgesia. Patient without complaints today and desires discharge.  Denies dizziness, weakness when ambulating.     Vitals:    08/15/18 1600 08/15/18 2230 18 0800 18   BP: (!) 98/69 103/67 103/72 116/72   Pulse: 80 78 71 74   Resp:    Temp: 36.6 °C (97.9 °F) 36.4 °C (97.6 °F) 36.7 °C (98 °F) 36.2 °C (97.2 °F)   TempSrc:       SpO2: 96% 95% 97% 96%   Weight:       Height:           Current Facility-Administered Medications   Medication Dose   • ibuprofen (MOTRIN) tablet 600 mg  600 mg   • oxyCODONE-acetaminophen (PERCOCET) 5-325 MG per tablet 1 Tab  1 Tab   • oxyCODONE immediate release (ROXICODONE) tablet 10 mg  10 mg   • ondansetron (ZOFRAN) syringe/vial injection 4 mg  4 mg    Or   • ondansetron (ZOFRAN ODT) dispertab 4 mg  4 mg   • oxytocin (PITOCIN) infusion (for postpartum)   mL/hr   • LR infusion     • PRN oxytocin (PITOCIN)  (20 Units/1000 mL) PRN for excessive uterine bleeding - See Admin Instr  125-999 mL/hr   • miSOPROStol (CYTOTEC) tablet 600 mcg  600 mcg   • methylergonovine (METHERGINE) injection 0.2 mg  0.2 mg   • docusate sodium (COLACE) capsule 100 mg  100 mg   • simethicone (MYLICON) chewable tab 80 mg  80 mg   • prenatal plus vitamin (STUARTNATAL 1+1) 27-1 MG tablet 1 Tab  1 Tab   • acetaminophen (TYLENOL) tablet 325 mg  325 mg   • lactated ringers infusion     • ferrous gluconate (FERGON) tablet 324 mg  324 mg   • polyethylene glycol/lytes (MIRALAX) PACKET 1 Packet  1 Packet   • magnesium hydroxide (MILK OF MAGNESIA) suspension 30 mL  30 mL       Exam:  Breast Exam: negative  Abdomen: Abdomen soft, non-tender. BS normal. No masses,  No organomegaly  Fundus Non Tender: yes  Incision: dry and intact, healing well with good reapproximation, no evidence of infection, separation or keloid formation.  Perineum: perineum intact  Extremity: extremities, peripheral pulses and reflexes normal, no edema, redness or tenderness in the calves or thighs, Homans sign is negative, no sign of DVT, feet normal, good pulses, normal color, temperature and sensation     Labs:  Recent Labs      08/15/18   0314   WBC  15.5*   RBC  3.18*   HEMOGLOBIN  9.1*   HEMATOCRIT  28.2*   MCV  88.7   MCH  28.6   MCHC  32.3*   RDW  49.1   PLATELETCT  261   MPV  10.4        Activity:   Discharge to home  Pelvic Rest x 6 weeks    Assessment:  normal postpartum course  Discharge Assessment: No areas of skin breakdown/redness; surgical incision intact/healing   Anemia    [facility] for vaginal ; 1 week for incision check.      Discharge Meds: Pt has ferrous sulfate and colace at home and will continue to take once discharged.  Ferrous sulfate 325mg PO BID.  Colace 1 tab PO BID PRN.  No current outpatient prescriptions on file.       NANETTE Turpin

## 2018-08-17 NOTE — PROGRESS NOTES
"Baby in NICU. Mother has been pumping every 3 hours. Mother has [employer] [place] and has her own personal pump. Discussed with mother using HG pump for 1 week to protect milk supply, info sheet on how to rent pump through [facility] given. Mother able to pump 20-30 ml of colostrum with last pump session. Pump settings reviewed speed 80/60, suction 30% x 15 minutes, every 3 hours. Mother reports her personal breast pump seems to \"get out more milk than HG pump\". Reinforced with mother importance of using HG pump then hand expressing x 2 minutes on each breast, protect & increase milk supply.  "

## 2018-08-17 NOTE — PROGRESS NOTES
Report received, pt care assumed, pt denies pain at this time, updated on POC, ambulating down to NICU to see baby. All needs met at this time.

## 2018-08-21 ENCOUNTER — NON-PROVIDER VISIT (OUTPATIENT)
Dept: OBGYN | Facility: CLINIC | Age: 33
End: 2018-08-21
Payer: COMMERCIAL

## 2018-08-21 NOTE — NON-PROVIDER
Pt here today for staple removal  Operation date: 08/14/2018  BP: 104/64  Lynne Kraft C.N.M.notified and checked pt's incision.  Steri strips placed  Good # [phone]

## 2018-08-22 ENCOUNTER — TELEPHONE (OUTPATIENT)
Dept: OBGYN | Facility: CLINIC | Age: 33
End: 2018-08-22

## 2018-08-22 ENCOUNTER — HOSPITAL ENCOUNTER (EMERGENCY)
Facility: MEDICAL CENTER | Age: 33
End: 2018-08-22
Attending: EMERGENCY MEDICINE
Payer: COMMERCIAL

## 2018-08-22 ENCOUNTER — APPOINTMENT (OUTPATIENT)
Dept: RADIOLOGY | Facility: MEDICAL CENTER | Age: 33
End: 2018-08-22
Attending: EMERGENCY MEDICINE
Payer: COMMERCIAL

## 2018-08-22 ENCOUNTER — POST PARTUM (OUTPATIENT)
Dept: OBGYN | Facility: CLINIC | Age: 33
End: 2018-08-22

## 2018-08-22 VITALS — DIASTOLIC BLOOD PRESSURE: 64 MMHG | SYSTOLIC BLOOD PRESSURE: 118 MMHG | BODY MASS INDEX: 28.9 KG/M2 | WEIGHT: 158 LBS

## 2018-08-22 VITALS
RESPIRATION RATE: 18 BRPM | WEIGHT: 160.05 LBS | SYSTOLIC BLOOD PRESSURE: 134 MMHG | HEART RATE: 94 BPM | HEIGHT: 62 IN | BODY MASS INDEX: 29.45 KG/M2 | DIASTOLIC BLOOD PRESSURE: 89 MMHG | OXYGEN SATURATION: 98 % | TEMPERATURE: 98 F

## 2018-08-22 DIAGNOSIS — O42.919 PRETERM PREMATURE RUPTURE OF MEMBRANES (PPROM) WITH UNKNOWN ONSET OF LABOR: ICD-10-CM

## 2018-08-22 DIAGNOSIS — T81.30XA WOUND DISRUPTION, INITIAL ENCOUNTER: ICD-10-CM

## 2018-08-22 DIAGNOSIS — Z98.891 STATUS POST C-SECTION: ICD-10-CM

## 2018-08-22 LAB
ANION GAP SERPL CALC-SCNC: 9 MMOL/L (ref 0–11.9)
APPEARANCE UR: CLEAR
BACTERIA #/AREA URNS HPF: ABNORMAL /HPF
BASOPHILS # BLD AUTO: 0.8 % (ref 0–1.8)
BASOPHILS # BLD: 0.04 K/UL (ref 0–0.12)
BILIRUB UR QL STRIP.AUTO: NEGATIVE
BUN SERPL-MCNC: 6 MG/DL (ref 8–22)
CALCIUM SERPL-MCNC: 9.1 MG/DL (ref 8.5–10.5)
CHLORIDE SERPL-SCNC: 107 MMOL/L (ref 96–112)
CO2 SERPL-SCNC: 24 MMOL/L (ref 20–33)
COLOR UR: YELLOW
CREAT SERPL-MCNC: 0.49 MG/DL (ref 0.5–1.4)
EOSINOPHIL # BLD AUTO: 0.14 K/UL (ref 0–0.51)
EOSINOPHIL NFR BLD: 2.7 % (ref 0–6.9)
EPI CELLS #/AREA URNS HPF: NEGATIVE /HPF
ERYTHROCYTE [DISTWIDTH] IN BLOOD BY AUTOMATED COUNT: 50.3 FL (ref 35.9–50)
GLUCOSE SERPL-MCNC: 85 MG/DL (ref 65–99)
GLUCOSE UR STRIP.AUTO-MCNC: NEGATIVE MG/DL
HCT VFR BLD AUTO: 34.1 % (ref 37–47)
HGB BLD-MCNC: 10.9 G/DL (ref 12–16)
HYALINE CASTS #/AREA URNS LPF: ABNORMAL /LPF
IMM GRANULOCYTES # BLD AUTO: 0.02 K/UL (ref 0–0.11)
IMM GRANULOCYTES NFR BLD AUTO: 0.4 % (ref 0–0.9)
KETONES UR STRIP.AUTO-MCNC: NEGATIVE MG/DL
LEUKOCYTE ESTERASE UR QL STRIP.AUTO: ABNORMAL
LYMPHOCYTES # BLD AUTO: 2.06 K/UL (ref 1–4.8)
LYMPHOCYTES NFR BLD: 40.2 % (ref 22–41)
MCH RBC QN AUTO: 28.5 PG (ref 27–33)
MCHC RBC AUTO-ENTMCNC: 32 G/DL (ref 33.6–35)
MCV RBC AUTO: 89.3 FL (ref 81.4–97.8)
MICRO URNS: ABNORMAL
MONOCYTES # BLD AUTO: 0.23 K/UL (ref 0–0.85)
MONOCYTES NFR BLD AUTO: 4.5 % (ref 0–13.4)
NEUTROPHILS # BLD AUTO: 2.63 K/UL (ref 2–7.15)
NEUTROPHILS NFR BLD: 51.4 % (ref 44–72)
NITRITE UR QL STRIP.AUTO: NEGATIVE
NRBC # BLD AUTO: 0 K/UL
NRBC BLD-RTO: 0 /100 WBC
PH UR STRIP.AUTO: 7 [PH]
PLATELET # BLD AUTO: 342 K/UL (ref 164–446)
PMV BLD AUTO: 9.3 FL (ref 9–12.9)
POTASSIUM SERPL-SCNC: 3.8 MMOL/L (ref 3.6–5.5)
PROT UR QL STRIP: NEGATIVE MG/DL
RBC # BLD AUTO: 3.82 M/UL (ref 4.2–5.4)
RBC # URNS HPF: ABNORMAL /HPF
RBC UR QL AUTO: NEGATIVE
SODIUM SERPL-SCNC: 140 MMOL/L (ref 135–145)
SP GR UR STRIP.AUTO: 1
UROBILINOGEN UR STRIP.AUTO-MCNC: 0.2 MG/DL
WBC # BLD AUTO: 5.1 K/UL (ref 4.8–10.8)
WBC #/AREA URNS HPF: ABNORMAL /HPF

## 2018-08-22 PROCEDURE — 85025 COMPLETE CBC W/AUTO DIFF WBC: CPT

## 2018-08-22 PROCEDURE — 36415 COLL VENOUS BLD VENIPUNCTURE: CPT

## 2018-08-22 PROCEDURE — 76856 US EXAM PELVIC COMPLETE: CPT

## 2018-08-22 PROCEDURE — 80048 BASIC METABOLIC PNL TOTAL CA: CPT

## 2018-08-22 PROCEDURE — 99024 POSTOP FOLLOW-UP VISIT: CPT | Mod: 24 | Performed by: OBSTETRICS & GYNECOLOGY

## 2018-08-22 PROCEDURE — 99284 EMERGENCY DEPT VISIT MOD MDM: CPT

## 2018-08-22 PROCEDURE — 81001 URINALYSIS AUTO W/SCOPE: CPT

## 2018-08-22 RX ORDER — CEPHALEXIN 500 MG/1
500 CAPSULE ORAL 4 TIMES DAILY
Qty: 28 CAP | Refills: 0 | Status: SHIPPED | OUTPATIENT
Start: 2018-08-22 | End: 2018-08-29

## 2018-08-22 ASSESSMENT — PAIN SCALES - GENERAL
PAINLEVEL_OUTOF10: 5
PAINLEVEL_OUTOF10: 4

## 2018-08-22 NOTE — DISCHARGE INSTRUCTIONS
Wound Dehiscence  Wound dehiscence is when a surgical cut (incision) opens up and does not heal properly. It usually happens 7-10 days after surgery. You may have bleeding from the cut. You may also have pain or a fever. This condition should be treated early.  HOME CARE  · Only take medicines as told by your doctor.  · Take your antibiotic medicine as told. Finish it even if you start to feel better.  · Wash your wound with warm, soapy water 2 times a day, or as told. Pat the wound dry. Do not rub the wound.  · Change bandages (dressings) as often as told. Wash your hands before and after changing bandages. Apply bandages as told.  · Take showers. Do not soak the wound, bathe, swim, or use a hot tub until directed by your doctor.  · Avoid exercises that make you sweat.  · Use medicines that stop itching as told by your doctor. The wound may itch as it heals. Do not pick or scratch at the wound.  · Do not lift more than 10 pounds (4.5 kilograms) until the wound is healed, or as told by your doctor.  · Keep all doctor visits as told.  GET HELP IF:  · You have a lot of bleeding from your surgical cut.  · Your wound does not seem to be healing right.  · You have a fever.  GET HELP RIGHT AWAY IF:  · You have more puffiness (swelling) or redness around the wound.  · You have more pain in the wound.  · You have yellowish white fluid (pus) coming from the wound.  · More of the wound breaks open.  MAKE SURE YOU:  · Understand these instructions.  · Will watch your condition.  · Will get help right away if you are not doing well or get worse.  This information is not intended to replace advice given to you by your health care provider. Make sure you discuss any questions you have with your health care provider.  Document Released: 12/06/2010 Document Revised: 01/08/2016 Document Reviewed: 08/25/2014  [employer] Interactive Patient Education © 2017 [employer]

## 2018-08-22 NOTE — ED TRIAGE NOTES
"Lou Vergara  Chief Complaint   Patient presents with   • Wound Check      on , staples removed yesterday. Pt reports \"oozing\" yellow discharge at incision site. Dehiscence noted on RIGHT side of surgical incision.     Pt ambulatory to triage with above complaint. Pt tearful in triage. No active bleeding or discharge noted. Pt afebrile in triage. Sepsis screening completed, score of 2. Pt states staples were removed on Friday before pt discharged home. Pt states, \"a couple staples were put back in because it wasn't closed.\" Pt reports having staples removed yesterday at pregnancy center.     /96   Pulse (!) 105   Temp 36.7 °C (98 °F) (Temporal)   Resp 14   Ht 1.575 m (5' 2\")   Wt 72.6 kg (160 lb 0.9 oz)   SpO2 98%   Breastfeeding? Yes   BMI 29.27 kg/m²     Pt informed of triage process and encouraged to notify staff of any changes or concerns. Pt verbalized understanding of instructions. Pt placed back in lobby.     "

## 2018-08-22 NOTE — PROGRESS NOTES
Pt here for C/S check delivered on 8/14/18  Currently pumping.   # [phone]  Pt states was discharged from hospital today and was given keflex to   Avoid infection, but not sure if she can take will pumping.

## 2018-08-22 NOTE — PROGRESS NOTES
SUBJECTIVE:  Lou Vergara presents to the clinic 1 weeks following RLTCS with BTL    Eating a regular diet without difficulty. Bowel movement are Normal.  Pain is controlled with current analgesics.  Medication(s) being used: narcotic analgesics including oxycodone/acetaminophen (Percocet, Tylox). Spotting. Patient Denies Incisional pain, drainage or redness    OBJECTIVE:  /64   Wt 71.7 kg (158 lb)   BMI 28.90 kg/m²   Current Outpatient Prescriptions on File Prior to Visit   Medication Sig Dispense Refill   • oxyCODONE-acetaminophen (PERCOCET) 5-325 MG Tab Take 1 Tab by mouth every four hours as needed for up to 7 days. 30 Tab 0     No current facility-administered medications on file prior to visit.        Constitutional:  alert, healthy, no distress, well developed.  Abdomen:  soft, bowel sounds active, non-tender.  Incision:  no erythema, slight separation of the wound on the right side. No signs of infection. Viable tissue seen    IMPRESSION: Postoperative course complicated by wound separation  No return to work until further notice.    Lab:   Recent Results (from the past 1008 hour(s))   AMNISURE ROM ASSAY    Collection Time: 08/01/18  5:30 PM   Result Value Ref Range    AmniSure ROM Negative Negative   AMNISURE ROM ASSAY    Collection Time: 08/01/18  7:29 PM   Result Value Ref Range    AmniSure ROM Negative Negative   CBC WITHOUT DIFFERENTIAL    Collection Time: 08/02/18 10:15 AM   Result Value Ref Range    WBC 8.2 4.8 - 10.8 K/uL    RBC 3.58 (L) 4.20 - 5.40 M/uL    Hemoglobin 10.1 (L) 12.0 - 16.0 g/dL    Hematocrit 31.6 (L) 37.0 - 47.0 %    MCV 88.3 81.4 - 97.8 fL    MCH 28.2 27.0 - 33.0 pg    MCHC 32.0 (L) 33.6 - 35.0 g/dL    RDW 45.2 35.9 - 50.0 fL    Platelet Count 229 164 - 446 K/uL    MPV 10.0 9.0 - 12.9 fL   COMP METABOLIC PANEL    Collection Time: 08/02/18 10:15 AM   Result Value Ref Range    Sodium 137 135 - 145 mmol/L    Potassium 3.8 3.6 - 5.5 mmol/L    Chloride 108 96 - 112 mmol/L     Co2 20 20 - 33 mmol/L    Anion Gap 9.0 0.0 - 11.9    Glucose 85 65 - 99 mg/dL    Bun 4 (L) 8 - 22 mg/dL    Creatinine 0.37 (L) 0.50 - 1.40 mg/dL    Calcium 8.8 8.5 - 10.5 mg/dL    AST(SGOT) 16 12 - 45 U/L    ALT(SGPT) 11 2 - 50 U/L    Alkaline Phosphatase 89 30 - 99 U/L    Total Bilirubin 0.6 0.1 - 1.5 mg/dL    Albumin 3.7 3.2 - 4.9 g/dL    Total Protein 6.3 6.0 - 8.2 g/dL    Globulin 2.6 1.9 - 3.5 g/dL    A-G Ratio 1.4 g/dL   SERUM MAGNESIUM LEVEL 2 HRS     Collection Time: 08/02/18 10:15 AM   Result Value Ref Range    Magnesium 1.7 1.5 - 2.5 mg/dL   HOLD BLOOD BANK SPECIMEN (NOT TESTED)    Collection Time: 08/02/18 10:15 AM   Result Value Ref Range    Holding Tube - Bb DONE    ESTIMATED GFR    Collection Time: 08/02/18 10:15 AM   Result Value Ref Range    GFR If African American >60 >60 mL/min/1.73 m 2    GFR If Non African American >60 >60 mL/min/1.73 m 2   SERUM MAGNESIUM LEVELS     Collection Time: 08/02/18 12:31 PM   Result Value Ref Range    Magnesium 3.7 (H) 1.5 - 2.5 mg/dL   GRP B STREP, BY PCR (CARBONE BROTH)    Collection Time: 08/02/18  3:00 PM   Result Value Ref Range    Strep Gp B DNA PCR POSITIVE (A) Negative   SERUM MAGNESIUM LEVELS     Collection Time: 08/02/18  7:09 PM   Result Value Ref Range    Magnesium 5.1 (HH) 1.5 - 2.5 mg/dL   SERUM MAGNESIUM LEVELS     Collection Time: 08/03/18  1:03 AM   Result Value Ref Range    Magnesium 5.6 (HH) 1.5 - 2.5 mg/dL   SERUM MAGNESIUM LEVELS     Collection Time: 08/03/18  7:32 AM   Result Value Ref Range    Magnesium 6.0 (HH) 1.5 - 2.5 mg/dL   SERUM MAGNESIUM LEVELS     Collection Time: 08/03/18  1:05 PM   Result Value Ref Range    Magnesium 5.9 (HH) 1.5 - 2.5 mg/dL   SERUM MAGNESIUM LEVELS     Collection Time: 08/03/18  7:43 PM   Result Value Ref Range    Magnesium 5.4 (HH) 1.5 - 2.5 mg/dL   SERUM MAGNESIUM LEVELS     Collection Time: 08/04/18  1:04 AM   Result Value Ref Range    Magnesium 4.8 (H) 1.5 - 2.5 mg/dL   SERUM MAGNESIUM LEVELS     Collection Time:  08/04/18  7:02 AM   Result Value Ref Range    Magnesium 5.0 (H) 1.5 - 2.5 mg/dL   SERUM MAGNESIUM LEVELS     Collection Time: 08/04/18  1:11 PM   Result Value Ref Range    Magnesium 4.7 (H) 1.5 - 2.5 mg/dL   SERUM MAGNESIUM LEVELS     Collection Time: 08/04/18  7:07 PM   Result Value Ref Range    Magnesium 4.7 (H) 1.5 - 2.5 mg/dL   SERUM MAGNESIUM LEVELS     Collection Time: 08/05/18  1:09 AM   Result Value Ref Range    Magnesium 4.4 (H) 1.5 - 2.5 mg/dL   ABO AND RH CONFIRMATION    Collection Time: 08/06/18 12:14 PM   Result Value Ref Range    ABO Confirm A     Second Rh Group POS    CBC WITH DIFFERENTIAL    Collection Time: 08/06/18 12:16 PM   Result Value Ref Range    WBC 9.3 4.8 - 10.8 K/uL    RBC 3.49 (L) 4.20 - 5.40 M/uL    Hemoglobin 10.0 (L) 12.0 - 16.0 g/dL    Hematocrit 30.6 (L) 37.0 - 47.0 %    MCV 87.7 81.4 - 97.8 fL    MCH 28.7 27.0 - 33.0 pg    MCHC 32.7 (L) 33.6 - 35.0 g/dL    RDW 46.1 35.9 - 50.0 fL    Platelet Count 253 164 - 446 K/uL    MPV 10.0 9.0 - 12.9 fL    Neutrophils-Polys 70.70 44.00 - 72.00 %    Lymphocytes 20.40 (L) 22.00 - 41.00 %    Monocytes 6.40 0.00 - 13.40 %    Eosinophils 0.40 0.00 - 6.90 %    Basophils 0.20 0.00 - 1.80 %    Immature Granulocytes 1.90 (H) 0.00 - 0.90 %    Nucleated RBC 0.00 /100 WBC    Neutrophils (Absolute) 6.53 2.00 - 7.15 K/uL    Lymphs (Absolute) 1.89 1.00 - 4.80 K/uL    Monos (Absolute) 0.59 0.00 - 0.85 K/uL    Eos (Absolute) 0.04 0.00 - 0.51 K/uL    Baso (Absolute) 0.02 0.00 - 0.12 K/uL    Immature Granulocytes (abs) 0.18 (H) 0.00 - 0.11 K/uL    NRBC (Absolute) 0.00 K/uL   COD (ADULT)    Collection Time: 08/08/18  5:05 AM   Result Value Ref Range    ABO Grouping Only A     Rh Grouping Only POS     Antibody Screen-Cod NEG    COD (ADULT)    Collection Time: 08/13/18  1:44 PM   Result Value Ref Range    ABO Grouping Only A     Rh Grouping Only POS     Antibody Screen-Cod NEG    CBC WITH DIFFERENTIAL    Collection Time: 08/14/18  6:21 AM   Result Value Ref Range     WBC 9.7 4.8 - 10.8 K/uL    RBC 3.71 (L) 4.20 - 5.40 M/uL    Hemoglobin 10.7 (L) 12.0 - 16.0 g/dL    Hematocrit 33.0 (L) 37.0 - 47.0 %    MCV 88.9 81.4 - 97.8 fL    MCH 28.8 27.0 - 33.0 pg    MCHC 32.4 (L) 33.6 - 35.0 g/dL    RDW 49.1 35.9 - 50.0 fL    Platelet Count 278 164 - 446 K/uL    MPV 10.1 9.0 - 12.9 fL    Neutrophils-Polys 70.90 44.00 - 72.00 %    Lymphocytes 20.60 (L) 22.00 - 41.00 %    Monocytes 5.70 0.00 - 13.40 %    Eosinophils 0.70 0.00 - 6.90 %    Basophils 0.20 0.00 - 1.80 %    Immature Granulocytes 1.90 (H) 0.00 - 0.90 %    Nucleated RBC 0.00 /100 WBC    Neutrophils (Absolute) 6.86 2.00 - 7.15 K/uL    Lymphs (Absolute) 1.99 1.00 - 4.80 K/uL    Monos (Absolute) 0.55 0.00 - 0.85 K/uL    Eos (Absolute) 0.07 0.00 - 0.51 K/uL    Baso (Absolute) 0.02 0.00 - 0.12 K/uL    Immature Granulocytes (abs) 0.18 (H) 0.00 - 0.11 K/uL    NRBC (Absolute) 0.00 K/uL   CBC without differential    Collection Time: 08/15/18  3:14 AM   Result Value Ref Range    WBC 15.5 (H) 4.8 - 10.8 K/uL    RBC 3.18 (L) 4.20 - 5.40 M/uL    Hemoglobin 9.1 (L) 12.0 - 16.0 g/dL    Hematocrit 28.2 (L) 37.0 - 47.0 %    MCV 88.7 81.4 - 97.8 fL    MCH 28.6 27.0 - 33.0 pg    MCHC 32.3 (L) 33.6 - 35.0 g/dL    RDW 49.1 35.9 - 50.0 fL    Platelet Count 261 164 - 446 K/uL    MPV 10.4 9.0 - 12.9 fL   URINALYSIS CULTURE, IF INDICATED    Collection Time: 08/22/18 11:10 AM   Result Value Ref Range    Color Yellow     Character Clear     Specific Gravity 1.005 <1.035    Ph 7.0 5.0 - 8.0    Glucose Negative Negative mg/dL    Ketones Negative Negative mg/dL    Protein Negative Negative mg/dL    Bilirubin Negative Negative    Urobilinogen, Urine 0.2 Negative    Nitrite Negative Negative    Leukocyte Esterase Trace (A) Negative    Occult Blood Negative Negative    Micro Urine Req Microscopic    URINE MICROSCOPIC (W/UA)    Collection Time: 08/22/18 11:10 AM   Result Value Ref Range    WBC 0-2 /hpf    RBC 0-2 /hpf    Bacteria Few (A) None /hpf    Epithelial  Cells Negative /hpf    Hyaline Cast 0-2 /lpf   CBC WITH DIFFERENTIAL    Collection Time: 08/22/18 11:40 AM   Result Value Ref Range    WBC 5.1 4.8 - 10.8 K/uL    RBC 3.82 (L) 4.20 - 5.40 M/uL    Hemoglobin 10.9 (L) 12.0 - 16.0 g/dL    Hematocrit 34.1 (L) 37.0 - 47.0 %    MCV 89.3 81.4 - 97.8 fL    MCH 28.5 27.0 - 33.0 pg    MCHC 32.0 (L) 33.6 - 35.0 g/dL    RDW 50.3 (H) 35.9 - 50.0 fL    Platelet Count 342 164 - 446 K/uL    MPV 9.3 9.0 - 12.9 fL    Neutrophils-Polys 51.40 44.00 - 72.00 %    Lymphocytes 40.20 22.00 - 41.00 %    Monocytes 4.50 0.00 - 13.40 %    Eosinophils 2.70 0.00 - 6.90 %    Basophils 0.80 0.00 - 1.80 %    Immature Granulocytes 0.40 0.00 - 0.90 %    Nucleated RBC 0.00 /100 WBC    Neutrophils (Absolute) 2.63 2.00 - 7.15 K/uL    Lymphs (Absolute) 2.06 1.00 - 4.80 K/uL    Monos (Absolute) 0.23 0.00 - 0.85 K/uL    Eos (Absolute) 0.14 0.00 - 0.51 K/uL    Baso (Absolute) 0.04 0.00 - 0.12 K/uL    Immature Granulocytes (abs) 0.02 0.00 - 0.11 K/uL    NRBC (Absolute) 0.00 K/uL   BASIC METABOLIC PANEL    Collection Time: 08/22/18 11:40 AM   Result Value Ref Range    Sodium 140 135 - 145 mmol/L    Potassium 3.8 3.6 - 5.5 mmol/L    Chloride 107 96 - 112 mmol/L    Co2 24 20 - 33 mmol/L    Glucose 85 65 - 99 mg/dL    Bun 6 (L) 8 - 22 mg/dL    Creatinine 0.49 (L) 0.50 - 1.40 mg/dL    Calcium 9.1 8.5 - 10.5 mg/dL    Anion Gap 9.0 0.0 - 11.9   ESTIMATED GFR    Collection Time: 08/22/18 11:40 AM   Result Value Ref Range    GFR If African American >60 >60 mL/min/1.73 m 2    GFR If Non African American >60 >60 mL/min/1.73 m 2       PLAN:  Referral to wound care made.  Precautions d/w pt, already on Keflex  F/U next week  Continue any current medications.  (See Med List for details.)  Return to clinic  : in 7 day(s).

## 2018-08-22 NOTE — TELEPHONE ENCOUNTER
Dr Haq put in the referral for wound care. I called wound care spoke with Josette at wound care and scheduled the patient for tomorrow at 10 am. I physically talked to the patient gave her the information to wound care, address and phone number. I give her the date and time for her appointment. Patient verbalized understanding and had no questions.

## 2018-08-22 NOTE — ED PROVIDER NOTES
"ED Provider Note    CHIEF COMPLAINT  Chief Complaint   Patient presents with   • Wound Check      on , staples removed yesterday. Pt reports \"oozing\" yellow discharge at incision site. Dehiscence noted on RIGHT side of surgical incision.         HPI  Lou Vergara is a 32 y.o. female who presents to the emergency department for wound check.  Patient underwent  by Dr. Camara on .  She had staples taken out , but states that the right side of the wound opened and 3 additional staples were placed.  She subsequently followed up with pregnancy center to have staple removal.  Staples were removed.  The wound subsequently opened. oozing clear liquid.  She has not had fevers or chills.  She does note that this morning she had foul-smelling vaginal discharge.  She has mild what she perceives as normal postoperative pain across the pelvis.  No dysuria hematuria frequency.    REVIEW OF SYSTEMS  As above  All other systems are negative.     PAST MEDICAL HISTORY  Past Medical History:   Diagnosis Date   • TMJ arthritis        FAMILY HISTORY  No family history on file.    SOCIAL HISTORY  Social History   Substance Use Topics   • Smoking status: Never Smoker   • Smokeless tobacco: Never Used   • Alcohol use No       SURGICAL HISTORY  Past Surgical History:   Procedure Laterality Date   • REPEAT C SECTION  2018    Procedure: REPEAT C SECTION;  Surgeon: Errol Camara M.D.;  Location: LABOR AND DELIVERY;  Service: Labor and Delivery   • OTHER      Tonselectomy       CURRENT MEDICATIONS  Home Medications     Reviewed by Landen Florez R.N. (Registered Nurse) on 18 at 0950  Med List Status: Complete   Medication Last Dose Status   oxyCODONE-acetaminophen (PERCOCET) 5-325 MG Tab 2018 Active                ALLERGIES  No Known Allergies    PHYSICAL EXAM  VITAL SIGNS: /96   Pulse (!) 105   Temp 36.7 °C (98 °F) (Temporal)   Resp 14   Ht 1.575 m (5' 2\")   Wt 72.6 kg (160 " lb 0.9 oz)   SpO2 98%   Breastfeeding? Yes   BMI 29.27 kg/m²   Constitutional: Awake and alert  HENT: Moist mucous membranes  Eyes: Sclera white  Neck: Normal range of motion  Cardiovascular: Mildly tachycardic  heart rate, Normal rhythm  Thorax & Lungs: Normal breath sounds, No respiratory distress, No wheezing, No chest tenderness.   Abdomen: Soft, nondistended.  Low transverse  wound.  There appears to be superficial dehiscence over the right lateral aspect of the wound.  This does not appear to extend deeply.  She has minimal surrounding tenderness.  No redness warmth or purulent discharge.  Skin: No rash.   Back: No tenderness, No CVA tenderness.   Extremities: Intact, symmetric distal pulses, no edema.  Neurologic: Grossly normal    RADIOLOGY/PROCEDURES  US-PELVIC TRANSABDOMINAL   Final Result      There is free fluid and debris within the endometrial canal with associated vascularity, likely hematoma. There is a tiny adjacent pocket of fluid in the  incision. These could relate to post partum and  changes. No sonographic evidence    of retained products of conception.         Imaging is interpreted by radiologist    Labs:   Results for orders placed or performed during the hospital encounter of 18   CBC WITH DIFFERENTIAL   Result Value Ref Range    WBC 5.1 4.8 - 10.8 K/uL    RBC 3.82 (L) 4.20 - 5.40 M/uL    Hemoglobin 10.9 (L) 12.0 - 16.0 g/dL    Hematocrit 34.1 (L) 37.0 - 47.0 %    MCV 89.3 81.4 - 97.8 fL    MCH 28.5 27.0 - 33.0 pg    MCHC 32.0 (L) 33.6 - 35.0 g/dL    RDW 50.3 (H) 35.9 - 50.0 fL    Platelet Count 342 164 - 446 K/uL    MPV 9.3 9.0 - 12.9 fL    Neutrophils-Polys 51.40 44.00 - 72.00 %    Lymphocytes 40.20 22.00 - 41.00 %    Monocytes 4.50 0.00 - 13.40 %    Eosinophils 2.70 0.00 - 6.90 %    Basophils 0.80 0.00 - 1.80 %    Immature Granulocytes 0.40 0.00 - 0.90 %    Nucleated RBC 0.00 /100 WBC    Neutrophils (Absolute) 2.63 2.00 - 7.15 K/uL    Lymphs (Absolute)  2.06 1.00 - 4.80 K/uL    Monos (Absolute) 0.23 0.00 - 0.85 K/uL    Eos (Absolute) 0.14 0.00 - 0.51 K/uL    Baso (Absolute) 0.04 0.00 - 0.12 K/uL    Immature Granulocytes (abs) 0.02 0.00 - 0.11 K/uL    NRBC (Absolute) 0.00 K/uL   BASIC METABOLIC PANEL   Result Value Ref Range    Sodium 140 135 - 145 mmol/L    Potassium 3.8 3.6 - 5.5 mmol/L    Chloride 107 96 - 112 mmol/L    Co2 24 20 - 33 mmol/L    Glucose 85 65 - 99 mg/dL    Bun 6 (L) 8 - 22 mg/dL    Creatinine 0.49 (L) 0.50 - 1.40 mg/dL    Calcium 9.1 8.5 - 10.5 mg/dL    Anion Gap 9.0 0.0 - 11.9   URINALYSIS CULTURE, IF INDICATED   Result Value Ref Range    Color Yellow     Character Clear     Specific Gravity 1.005 <1.035    Ph 7.0 5.0 - 8.0    Glucose Negative Negative mg/dL    Ketones Negative Negative mg/dL    Protein Negative Negative mg/dL    Bilirubin Negative Negative    Urobilinogen, Urine 0.2 Negative    Nitrite Negative Negative    Leukocyte Esterase Trace (A) Negative    Occult Blood Negative Negative    Micro Urine Req Microscopic    URINE MICROSCOPIC (W/UA)   Result Value Ref Range    WBC 0-2 /hpf    RBC 0-2 /hpf    Bacteria Few (A) None /hpf    Epithelial Cells Negative /hpf    Hyaline Cast 0-2 /lpf   ESTIMATED GFR   Result Value Ref Range    GFR If African American >60 >60 mL/min/1.73 m 2    GFR If Non African American >60 >60 mL/min/1.73 m 2        COURSE & MEDICAL DECISION MAKING  Patient presents for wound evaluation.  There is right lateral apparently superficial wound failure.  She has had some odor from the wound, but there is no evidence of infection without surrounding cellulitis.  No fever.  No leukocytosis.  She did describe some vaginal discharge I obtained ultrasound that shows what looks to be hematoma.  There is no apparent retained products of conception.    I consulted Dr. Haq.  We discussed the case as described above.  He would like the patient to be seen at the pregnancy center.  Stated that the patient would need to be  referred to wound care.  Advised cephalexin.      While undergoing workup in the emergency department family did contact the pregnancy center and she has an appointment today and 1:15 PM.  At this point appropriate for discharge with very close evaluation later on today.  She will return to the ER for fever, swelling, pain or concern.      FINAL IMPRESSION  1.  Wound failure, superficial        This dictation was created using voice recognition software. The accuracy of the dictation is limited to the abilities of the software.  The nursing notes were reviewed and certain aspects of this information were incorporated into this note.    Electronically signed by: Radames Coppola, 8/22/2018 10:38 AM

## 2018-08-22 NOTE — ED NOTES
Call placed to lab to inquire about urine results. Lab states they have the urine and will run it now.

## 2018-08-28 ENCOUNTER — POST PARTUM (OUTPATIENT)
Dept: OBGYN | Facility: CLINIC | Age: 33
End: 2018-08-28

## 2018-08-28 VITALS — BODY MASS INDEX: 28.72 KG/M2 | SYSTOLIC BLOOD PRESSURE: 118 MMHG | DIASTOLIC BLOOD PRESSURE: 72 MMHG | WEIGHT: 157 LBS

## 2018-08-28 DIAGNOSIS — Z98.891 S/P CESAREAN SECTION: ICD-10-CM

## 2018-08-28 PROCEDURE — 99024 POSTOP FOLLOW-UP VISIT: CPT | Performed by: OBSTETRICS & GYNECOLOGY

## 2018-08-28 ASSESSMENT — ENCOUNTER SYMPTOMS
DIARRHEA: 0
VOMITING: 0
NEUROLOGICAL NEGATIVE: 1
NAUSEA: 0
CONSTITUTIONAL NEGATIVE: 1
CARDIOVASCULAR NEGATIVE: 1
EYES NEGATIVE: 1
ABDOMINAL PAIN: 0
BLOOD IN STOOL: 0
HEARTBURN: 0
RESPIRATORY NEGATIVE: 1
PSYCHIATRIC NEGATIVE: 1
MUSCULOSKELETAL NEGATIVE: 1
CONSTIPATION: 0

## 2018-08-28 NOTE — PROGRESS NOTES
Pt here for C/S check from 08/14/2018 Dr. Camara   Phone#:[phone]  Pharmacy Confirmed.  C/O: incision reopened and oozing.

## 2018-08-30 ENCOUNTER — APPOINTMENT (OUTPATIENT)
Dept: WOUND CARE | Facility: MEDICAL CENTER | Age: 33
End: 2018-08-30
Attending: OBSTETRICS & GYNECOLOGY

## 2018-09-25 ENCOUNTER — POST PARTUM (OUTPATIENT)
Dept: OBGYN | Facility: CLINIC | Age: 33
End: 2018-09-25
Payer: COMMERCIAL

## 2018-09-25 VITALS — WEIGHT: 158 LBS | DIASTOLIC BLOOD PRESSURE: 74 MMHG | BODY MASS INDEX: 28.9 KG/M2 | SYSTOLIC BLOOD PRESSURE: 108 MMHG

## 2018-09-25 PROBLEM — O42.919 PRETERM PREMATURE RUPTURE OF MEMBRANES (PPROM) WITH UNKNOWN ONSET OF LABOR: Status: RESOLVED | Noted: 2018-08-12 | Resolved: 2018-09-25

## 2018-09-25 PROCEDURE — 90050 PR POSTPARTUM VISIT: CPT | Performed by: NURSE PRACTITIONER

## 2018-09-25 NOTE — PROGRESS NOTES
Subjective:      Lou Vergara is a 33 y.o. female who presents with No chief complaint on file.            HPI    ROS       Objective:     /74   Wt 71.7 kg (158 lb)   BMI 28.90 kg/m²      Physical Exam   Constitutional: She is oriented to person, place, and time. She appears well-developed and well-nourished.   HENT:   Head: Normocephalic.   Eyes: Pupils are equal, round, and reactive to light.   Neck: Normal range of motion. No thyromegaly present.   Cardiovascular: Normal rate, regular rhythm and normal heart sounds.    Pulmonary/Chest: Effort normal and breath sounds normal.   Abdominal: Soft. Bowel sounds are normal.   Genitourinary: Vagina normal and uterus normal.   Neurological: She is oriented to person, place, and time.   Skin: Skin is warm and dry.   Psychiatric: She has a normal mood and affect. Her behavior is normal. Judgment and thought content normal.   Nursing note and vitals reviewed.              Assessment/Plan:     There are no diagnoses linked to this encounter.

## 2018-09-25 NOTE — PROGRESS NOTES
Pt here today for postpartum exam.  Operation Date: 8/14/18  Currently: Breast feeding  BCM: pt had BTL, [facility].   Good ph:[phone]  Pap 2/7/18 WNL

## 2018-09-25 NOTE — PROGRESS NOTES
Subjective   Subjective:    Lou Vergara is a 33 y.o. female who presents for her postpartum exam s/p repeat LTCS at 34 weeks for PPROM. Had BTL. Her prenatal course was uncomplicated. Her postpartum course was complicated by a superficial wound break down with ER visit and Keflex.  She denies dysuria, vaginal bleeding, odor, itching or breast problems. She is breast feeding. Reports no sex prior to this appointment. Eating a regular diet without difficulty. Bowel movement are Normal.  The patient is not having any pain. Spotting, No current menses. . Patient Denies Incisional pain, drainage or redness. Patient denies postpartum depression.     Problem List     Patient Active Problem List    Diagnosis Date Noted   • Postpartum care following  delivery 2018   •  premature rupture of membranes (PPROM)  2018       Objective    See PE  Lab: H&H upon discharge 10.9/34.1  Weight - 158 lb  Vitals - 108/74    Assessment   Assessment:    1. PP care of lactating women   2. Exam WNL   3. Pap WNL   4. Desires contraception - not applicable had BTL    Plan   Plan:    1. Breastfeeding support   2. Continue PNV   3. Contraceptive counseling - not applicable had BTL.   4. Encouraged condom use for std protection if needed.   5. Discussed diet, exercise and resumption of sexual activity   6. May resume exercise program   7.  Follow up needed - continue annual women's health  8.  Smoking/etoh/drug screening - states no exposure

## 2018-12-20 ENCOUNTER — OFFICE VISIT (OUTPATIENT)
Dept: MEDICAL GROUP | Facility: PHYSICIAN GROUP | Age: 33
End: 2018-12-20
Payer: COMMERCIAL

## 2018-12-20 VITALS
OXYGEN SATURATION: 96 % | HEART RATE: 63 BPM | DIASTOLIC BLOOD PRESSURE: 64 MMHG | WEIGHT: 162.2 LBS | TEMPERATURE: 98.5 F | SYSTOLIC BLOOD PRESSURE: 118 MMHG | HEIGHT: 62 IN | BODY MASS INDEX: 29.85 KG/M2

## 2018-12-20 DIAGNOSIS — F41.9 ANXIETY AND DEPRESSION: ICD-10-CM

## 2018-12-20 DIAGNOSIS — Z00.00 HEALTHCARE MAINTENANCE: ICD-10-CM

## 2018-12-20 DIAGNOSIS — F32.A ANXIETY AND DEPRESSION: ICD-10-CM

## 2018-12-20 PROCEDURE — 99204 OFFICE O/P NEW MOD 45 MIN: CPT | Performed by: INTERNAL MEDICINE

## 2018-12-20 RX ORDER — ESCITALOPRAM OXALATE 20 MG/1
20 TABLET ORAL DAILY
Qty: 90 TAB | Refills: 1 | Status: SHIPPED | OUTPATIENT
Start: 2018-12-20 | End: 2018-12-20

## 2018-12-20 RX ORDER — SERTRALINE HYDROCHLORIDE 25 MG/1
25 TABLET, FILM COATED ORAL DAILY
Qty: 90 TAB | Refills: 1 | Status: SHIPPED | OUTPATIENT
Start: 2018-12-20 | End: 2019-06-07 | Stop reason: SDUPTHER

## 2018-12-20 ASSESSMENT — PATIENT HEALTH QUESTIONNAIRE - PHQ9
5. POOR APPETITE OR OVEREATING: 2 - MORE THAN HALF THE DAYS
SUM OF ALL RESPONSES TO PHQ QUESTIONS 1-9: 15
CLINICAL INTERPRETATION OF PHQ2 SCORE: 2

## 2018-12-20 NOTE — PATIENT INSTRUCTIONS
Breastfeeding and Inducing Lactation  Induced lactation is using hormones or other medicines and breast stimulation to help you produce breast milk. You may want to try induced lactation if you:  · Are adopting a baby.    · Are having a surrogate mother carry your baby.    · Have to stop breastfeeding for a period of time.    HOW DOES IT WORK?  During pregnancy, your hormones change to prepare your body to produce breast milk. After pregnancy, hormones signal your body to start making breast milk to feed your baby. When you do not go through these changes, it may be hard for you to produce enough breast milk to feed your baby. Your health care provider and a lactation consultant can help you produce milk using medicine and breast stimulation.    WILL I MAKE ENOUGH MILK TO FEED MY BABY?  Induced lactation may be successful. However, very few women who use induced lactation to produce breast milk can make all the milk their baby needs. You may need to feed your baby with donated breast milk or infant formula in addition to your breast milk. This will make sure your baby gets adequate nutrition. Induced lactation is usually more successful if you have been pregnant before.   HOW DOES MY BODY PRODUCE MILK?  To induce lactation, you will start taking hormones 3-4 months before you want to start breastfeeding. You will continue to take them until about 6 weeks before the baby arrives. When you stop taking the hormones, you will need to perform breast stimulation a number of times per day to encourage breast milk production. Breast stimulation can be performed by gently rubbing and stretching the nipple tissue. Breast stimulation can also be performed using a double electric hospital-grade pump to mimic a baby's suckling at the breast. Try to pump every 3 hours (8 times a day) for 20 minutes on each breast. If you are unable to pump that many times each day, do it as often as possible. This helps your body continue to make  milk. When you put your baby to your breast, your body may also respond to the smell, sound, and feel of your baby by increasing the amount of milk you produce.   Your health care provider may also prescribe medicine to stimulate lactation and increase your milk supply. Herbal medicines are also available to try to induce lactation. It is important to know that these medicines are not approved or regulated by the FDA. Always check with your health care provider before using any herbal medicine.  WHAT ELSE DO I NEED TO KNOW?  · You should only take hormones and medicines as directed by your health care provider or trained lactation consultant.  · Talk to your health care provider or lactation consultant if you need guidance. He or she may be able to help you start a milk supply and advise you as you make important decisions about nourishing your baby.  · Try using a supplemental nursing system to provide extra donated breast milk or formula at the breast while the baby nurses. This ensures that your infant gets enough nutrition while you are breastfeeding. Ask a lactation specialist to help you find and use this device.     This information is not intended to replace advice given to you by your health care provider. Make sure you discuss any questions you have with your health care provider.     Document Released: 12/18/2006 Document Revised: 12/23/2014 Document Reviewed: 10/10/2014  [employer] Interactive Patient Education ©2016 [employer]

## 2018-12-20 NOTE — PROGRESS NOTES
CC: To establish care with new PCP, anxiety and depression.    HISTORY OF THE PRESENT ILLNESS: Patient is a 33 y.o. female. This pleasant patient is here today to discuss on medical conditions as mentioned in HPI below.    Health Maintenance: Completed      Anxiety and depression  This is a new problem which patient is facing since past 3-4 months after her recent delivery of the 4th child. SHe has been having overwhelmed ,tearfulness as the remaining 3 children are all growing and having fights. Previously had similar Sx with her 2nd child when she was on Lexapro for 9 months.    Healthcare maintenance  Patient states that she already had all her health care maintenance up to date also with Pap smear this year at recent [facility] records which are scanned in the patient's chart.    Lactating mother  Recent  with delivery 4 months old child, currently lactating.    PHQ score 15, BMI within normal limits, no tobacco, no fall injuries    Allergies: Patient has no known allergies.    Current Outpatient Prescriptions Ordered in [facility]   Medication Sig Dispense Refill   • sertraline (ZOLOFT) 25 MG tablet Take 1 Tab by mouth every day. 90 Tab 1     No current Epic-ordered facility-administered medications on file.        Past Medical History:   Diagnosis Date   • TMJ arthritis        Past Surgical History:   Procedure Laterality Date   • REPEAT C SECTION  2018    Procedure: REPEAT C SECTION;  Surgeon: Errol Camara M.D.;  Location: LABOR AND DELIVERY;  Service: Labor and Delivery   • OTHER      Tonselectomy       Social History   Substance Use Topics   • Smoking status: Never Smoker   • Smokeless tobacco: Never Used   • Alcohol use No       Social History     Social History Narrative   • No narrative on file       History reviewed. No pertinent family history.    ROS:     - Constitutional: Negative for fever, chills, unexpected weight change, and fatigue/generalized weakness.     - HEENT:  "Negative for headaches, vision changes, hearing changes, ear pain, ear discharge, rhinorrhea, sinus congestion, sore throat, and neck pain.      - Respiratory: Negative for cough, sputum production, chest congestion, dyspnea, wheezing, and crackles.      - Cardiovascular: Negative for chest pain, palpitations, orthopnea, and bilateral lower extremity edema.     - Gastrointestinal: Negative for heartburn, nausea, vomiting, abdominal pain, hematochezia, melena, diarrhea, constipation, and greasy/foul-smelling stools.     - Genitourinary: Negative for dysuria, polyuria, hematuria, pyuria, urinary urgency, and urinary incontinence.     - Musculoskeletal: Negative for myalgias, back pain, and joint pain.     - Skin: Negative for rash, itching, cyanotic skin color change.     - Neurological: Negative for dizziness, tingling, tremors, focal sensory deficit, focal weakness and headaches.     - Endo/Heme/Allergies: Does not bruise/bleed easily.     - Psychiatric/Behavioral: As mentioned in HPI above negative for suicidal/homicidal ideation and memory loss.      Last lab work done in August 2018 reviewed and discussed with the patient, TSH was not done at that time.    Exam: Blood pressure 118/64, pulse 63, temperature 36.9 °C (98.5 °F), temperature source Temporal, height 1.575 m (5' 2\"), weight 73.6 kg (162 lb 3.2 oz), SpO2 96 %, currently breastfeeding. Body mass index is 29.67 kg/m².    General: Normal appearing. No distress.  HEENT: Normocephalic. Eyes conjunctiva clear lids without ptosis, pupils equal and reactive to light accommodation, ears normal shape and contour, canals are clear bilaterally, tympanic membranes are benign, nasal mucosa benign, oropharynx is without erythema, edema or exudates.   Neck: Supple without JVD or bruit. Thyroid is not enlarged.  Pulmonary: Clear to ausculation.  Normal effort. No rales, ronchi, or wheezing.  Cardiovascular: Regular rate and rhythm without murmur. Carotid and radial " pulses are intact and equal bilaterally.  Abdomen: Soft, nontender, nondistended. Normal bowel sounds. Liver and spleen are not palpable  Neurologic: Grossly nonfocal  Lymph: No cervical, supraclavicular or axillary lymph nodes are palpable  Skin: Warm and dry.  No obvious lesions.  Musculoskeletal: Normal gait. No extremity cyanosis, clubbing, or edema.  Psych: Tearful, depressed, PHQ 9 score at 15. Alert and oriented x3. Judgment and insight is normal.    Please note that this dictation was created using voice recognition software. I have made every reasonable attempt to correct obvious errors, but I expect that there are errors of grammar and possibly content that I did not discover before finalizing the note.      Assessment/Plan  1. Anxiety and depression  New problem, uncontrolled.  Will check a TSH level which was not done in the last lab work, start her on Zoloft 25 mg daily initially and increase the dose if it is not improved.  Given instructions to inform me in 2-3 weeks if there is no improvement.  Will also give referral to behavioral health which patient wants to schedule as per her convenience.  - TSH WITH REFLEX TO FT4; Future  - Patient has been identified as being depressed and appropriate orders and counseling have been given  - REFERRAL TO BEHAVIORAL HEALTH  - sertraline (ZOLOFT) 25 MG tablet; Take 1 Tab by mouth every day.  Dispense: 90 Tab; Refill: 1    2. Lactating mother  Though patient has used Lexapro during her first child for similar Sx for around 3 months at that time, I have reviewed the literature that it is not safe during lactation, discussed with the patient.

## 2018-12-20 NOTE — ASSESSMENT & PLAN NOTE
This is a new problem which patient is facing since past 3-4 months after her recent delivery of the 4th child. SHe has been having overwhelmed ,tearfulness as the remaining 3 children are all growing and having fights. Previously had similar Sx with her 2nd child when she was on Lexapro for 9 months.

## 2018-12-20 NOTE — ASSESSMENT & PLAN NOTE
Patient states that she already had all her health care maintenance up to date also with Pap smear this year at recent [facility] records which are scanned in the patient's chart.

## 2018-12-24 DIAGNOSIS — E03.9 HYPOTHYROIDISM, UNSPECIFIED TYPE: ICD-10-CM

## 2018-12-24 RX ORDER — LEVOTHYROXINE SODIUM 0.03 MG/1
25 TABLET ORAL
Qty: 30 TAB | Refills: 1 | Status: SHIPPED | OUTPATIENT
Start: 2018-12-24 | End: 2019-01-24

## 2018-12-26 ENCOUNTER — TELEPHONE (OUTPATIENT)
Dept: MEDICAL GROUP | Facility: PHYSICIAN GROUP | Age: 33
End: 2018-12-26

## 2018-12-26 NOTE — TELEPHONE ENCOUNTER
----- Message from Ranjana Marcum M.D. sent at 12/24/2018  5:08 PM PST -----  Your TSH levels are severely abnormally high at 99 which I am little concerned. You have the diagnosis of severe Hypothyroidism , which is the major cause of your depression.  I am starting on the initial dose of Levothyroxine at 25 mcg daily sent to your pharmacy , to please start ASAP.  Will need to recheck your TSH in 4 weeks after starting the medication so that I can adjust the dose of your Levothyroxine for getting it normalized. Placed the lab order around 01/23/2019 , please keep up the repeat lab appointment.

## 2019-01-24 DIAGNOSIS — E03.9 HYPOTHYROIDISM, UNSPECIFIED TYPE: ICD-10-CM

## 2019-01-24 RX ORDER — LEVOTHYROXINE SODIUM 0.05 MG/1
50 TABLET ORAL
Qty: 90 TAB | Refills: 1 | Status: SHIPPED | OUTPATIENT
Start: 2019-01-24 | End: 2019-03-22

## 2019-01-29 DIAGNOSIS — E03.9 HYPOTHYROIDISM, UNSPECIFIED TYPE: ICD-10-CM

## 2019-03-05 DIAGNOSIS — E03.9 HYPOTHYROIDISM, UNSPECIFIED TYPE: ICD-10-CM

## 2019-03-22 ENCOUNTER — OFFICE VISIT (OUTPATIENT)
Dept: MEDICAL GROUP | Facility: PHYSICIAN GROUP | Age: 34
End: 2019-03-22
Payer: COMMERCIAL

## 2019-03-22 VITALS
OXYGEN SATURATION: 94 % | TEMPERATURE: 98.4 F | SYSTOLIC BLOOD PRESSURE: 114 MMHG | WEIGHT: 151 LBS | HEART RATE: 78 BPM | HEIGHT: 62 IN | BODY MASS INDEX: 27.79 KG/M2 | DIASTOLIC BLOOD PRESSURE: 58 MMHG

## 2019-03-22 DIAGNOSIS — F41.9 ANXIETY AND DEPRESSION: ICD-10-CM

## 2019-03-22 DIAGNOSIS — Z23 NEED FOR VACCINATION: ICD-10-CM

## 2019-03-22 DIAGNOSIS — D64.9 ANEMIA, UNSPECIFIED TYPE: ICD-10-CM

## 2019-03-22 DIAGNOSIS — F32.A ANXIETY AND DEPRESSION: ICD-10-CM

## 2019-03-22 DIAGNOSIS — E03.9 HYPOTHYROIDISM, UNSPECIFIED TYPE: ICD-10-CM

## 2019-03-22 PROCEDURE — 99214 OFFICE O/P EST MOD 30 MIN: CPT | Mod: 25 | Performed by: INTERNAL MEDICINE

## 2019-03-22 PROCEDURE — 90715 TDAP VACCINE 7 YRS/> IM: CPT | Performed by: INTERNAL MEDICINE

## 2019-03-22 PROCEDURE — 90471 IMMUNIZATION ADMIN: CPT | Performed by: INTERNAL MEDICINE

## 2019-03-22 RX ORDER — LEVOTHYROXINE SODIUM 0.07 MG/1
75 TABLET ORAL
Qty: 90 TAB | Refills: 1 | Status: SHIPPED | OUTPATIENT
Start: 2019-03-22 | End: 2019-03-22 | Stop reason: SDUPTHER

## 2019-03-22 RX ORDER — LANOLIN ALCOHOL/MO/W.PET/CERES
325 CREAM (GRAM) TOPICAL DAILY
Qty: 90 TAB | Refills: 1 | Status: SHIPPED | OUTPATIENT
Start: 2019-03-22

## 2019-03-22 RX ORDER — LEVOTHYROXINE SODIUM 0.07 MG/1
75 TABLET ORAL
Qty: 30 TAB | Refills: 1 | Status: SHIPPED | OUTPATIENT
Start: 2019-03-22 | End: 2019-10-23 | Stop reason: SDUPTHER

## 2019-03-22 NOTE — ASSESSMENT & PLAN NOTE
This is a chronic health problem that is well controlled with current medications and lifestyle measures.  Currently on Zoloft 25 mg daily.

## 2019-03-22 NOTE — ASSESSMENT & PLAN NOTE
This is a chronic health problem that is well controlled with current medications and lifestyle measures.Currently on the levothyroxine 50 mcg every morning. Pt says that she did all the THyroid Antibody workup on this month at [email or web address] which is not available in EPic. Will get the records.

## 2019-03-22 NOTE — ASSESSMENT & PLAN NOTE
This is a chronic health problem that is uncontrolled with current medications and lifestyle measures.  Persistently low hemoglobin levels, last CBC reviewed.  Iron levels never done, patient not on any supplements at this time.  Denies any blood loss from the stools or menorrhagia.

## 2019-03-22 NOTE — PROGRESS NOTES
CC: Follow-up visit, hypothyroidism.    HISTORY OF PRESENT ILLNESS: Patient is a 33 y.o. female established patient who presents today to discuss her medical conditions as mentioned in HPI below.    Health Maintenance: Due for tetanus vaccine which is okay to get in the office today.  Will come for Pap smear in the next visit.    Anxiety and depression  This is a chronic health problem that is well controlled with current medications and lifestyle measures.  Currently on Zoloft 25 mg daily.    Hypothyroidism  This is a chronic health problem that is well controlled with current medications and lifestyle measures.Currently on the levothyroxine 50 mcg every morning. Pt says that she did all the THyroid Antibody workup on this month at [facility] which is not available in EPic. Will get the records.    Anemia  This is a chronic health problem that is uncontrolled with current medications and lifestyle measures.  Persistently low hemoglobin levels, last CBC reviewed.  Iron levels never done, patient not on any supplements at this time.  Denies any blood loss from the stools or menorrhagia.      PHQ score 0, BMI within normal limits, no tobacco, no fall injuries.    Patient Active Problem List    Diagnosis Date Noted   • Anemia 03/22/2019   • Hypothyroidism 01/29/2019   • Anxiety and depression 12/20/2018   • Healthcare maintenance 12/20/2018   • Lactating mother 12/20/2018      Allergies:Patient has no known allergies.    Current Outpatient Prescriptions   Medication Sig Dispense Refill   • levothyroxine (SYNTHROID) 75 MCG Tab Take 1 Tab by mouth Every morning on an empty stomach. 30 Tab 1   • ferrous sulfate 325 (65 Fe) MG EC tablet Take 1 Tab by mouth every day. 90 Tab 1   • sertraline (ZOLOFT) 25 MG tablet Take 1 Tab by mouth every day. 90 Tab 1     No current facility-administered medications for this visit.        Social History   Substance Use Topics   • Smoking status: Never Smoker   • Smokeless tobacco: Never Used    • Alcohol use Yes      Comment: occasional     Social History     Social History Narrative   • No narrative on file       Family History   Problem Relation Age of Onset   • Arthritis Mother    • Hypertension Father    • Psychiatry Father    • Cancer Maternal Grandmother    • Diabetes Paternal Grandmother    • Diabetes Paternal Grandfather         ROS:     - Constitutional:  Negative for fever, chills, unexpected weight change, and fatigue/generalized weakness.    - HEENT:  Negative for headaches, vision changes, hearing changes, ear pain, ear discharge, rhinorrhea, sinus congestion, sore throat, and neck pain.      - Respiratory: Negative for cough, sputum production, chest congestion, dyspnea, wheezing, and crackles.      - Cardiovascular: Negative for chest pain, palpitations, orthopnea, and bilateral lower extremity edema.     - Gastrointestinal: Negative for heartburn, nausea, vomiting, abdominal pain, hematochezia, melena, diarrhea, constipation, and greasy/foul-smelling stools.     - Genitourinary: Negative for dysuria, polyuria, hematuria, pyuria, urinary urgency, and urinary incontinence.     - Musculoskeletal: Negative for myalgias, back pain, and joint pain.     - Skin: Negative for rash, itching, cyanotic skin color change.     - Neurological: Negative for dizziness, tingling, tremors, focal sensory deficit, focal weakness and headaches.     - Endo/Heme/Allergies: Does not bruise/bleed easily.     - Psychiatric/Behavioral: Negative for depression, suicidal/homicidal ideation and memory loss.        Last lab work in March 2019 at [facility] reviewed on patient cell phone which is not available in the epic.  Will request further records and get them scanned in.  As per my review on those cell phone jerilyn patient does have positive thyroglobulin antibodies but all other antibodies are negative, TSH levels are remaining high at 6.6 which came down from previous 96.    Exam:    Blood pressure 114/58, pulse 78,  "temperature 36.9 °C (98.4 °F), temperature source Temporal, height 1.575 m (5' 2\"), weight 68.5 kg (151 lb), SpO2 94 %, currently breastfeeding. Body mass index is 27.62 kg/m².    General:  Well nourished, well developed female in NAD  Head is grossly normal.  Neck: Supple without JVD or bruit. Thyroid is not enlarged.  Pulmonary: Clear to ausculation and percussion.  Normal effort. No rales, ronchi, or wheezing.  Cardiovascular: Regular rate and rhythm without murmur. Carotid and radial pulses are intact and equal bilaterally.  Extremities: no clubbing, cyanosis, or edema.    Please note that this dictation was created using voice recognition software. I have made every reasonable attempt to correct obvious errors, but I expect that there are errors of grammar and possibly content that I did not discover before finalizing the note.    Assessment/Plan:  1. Hypothyroidism, unspecified type  Amazing improvement from TSH levels of 96 to 6.6 after we have started levothyroxine and titrated up to 50 mcg previously.  But given the abnormal TSH though it is improved to 6.6 will increase the dose of her levothyroxine to 75 mcg at this time and recheck the TSH levels in 5 weeks.  I also told the plan to the patient that if the TSH levels got too low with the 75 I might need to adjust as 50 mcg for a few days on a week and 75 mcg for a few days in a week which she understood and agreed.  - levothyroxine (SYNTHROID) 75 MCG Tab; Take 1 Tab by mouth Every morning on an empty stomach.  Dispense: 30 Tab; Refill: 1  - TSH WITH REFLEX TO FT4; Future    2. Anxiety and depression  Well-controlled, continue current Zoloft 20 mg daily.    3. Need for vaccination  - Tdap =>6yo IM    4. Anemia, unspecified type  Uncontrolled, persistently low hemoglobin levels.  Will check iron levels at this time and also start on iron supplementation.  - FOLATE; Future  - IRON/TOTAL IRON BIND; Future  - VITAMIN B12; Future  - FERRITIN; Future  - ferrous " sulfate 325 (65 Fe) MG EC tablet; Take 1 Tab by mouth every day.  Dispense: 90 Tab; Refill: 1

## 2019-04-04 ENCOUNTER — HOSPITAL ENCOUNTER (OUTPATIENT)
Facility: MEDICAL CENTER | Age: 34
End: 2019-04-04
Attending: INTERNAL MEDICINE
Payer: COMMERCIAL

## 2019-04-04 ENCOUNTER — OFFICE VISIT (OUTPATIENT)
Dept: MEDICAL GROUP | Facility: PHYSICIAN GROUP | Age: 34
End: 2019-04-04
Payer: COMMERCIAL

## 2019-04-04 VITALS
WEIGHT: 150 LBS | SYSTOLIC BLOOD PRESSURE: 120 MMHG | TEMPERATURE: 98.1 F | DIASTOLIC BLOOD PRESSURE: 64 MMHG | HEART RATE: 68 BPM | OXYGEN SATURATION: 97 % | BODY MASS INDEX: 27.6 KG/M2 | HEIGHT: 62 IN

## 2019-04-04 DIAGNOSIS — Z12.4 PAP SMEAR FOR CERVICAL CANCER SCREENING: ICD-10-CM

## 2019-04-04 LAB
FORWARD REASON: SPWHY: NORMAL
FORWARDED TO LAB: SPWHR: NORMAL
SPECIMEN SENT: SPWT1: NORMAL

## 2019-04-04 PROCEDURE — 99395 PREV VISIT EST AGE 18-39: CPT | Performed by: INTERNAL MEDICINE

## 2019-04-04 NOTE — PROGRESS NOTES
SUBJECTIVE: 33 y.o. female for annual routine gynecologic exam  Chief Complaint   Patient presents with   • Gynecologic Exam       Obstetric History       T2      L4     SAB0   TAB0   Ectopic0   Molar0   Multiple0   Live Births1       Last Pap: one year back, no records from [place]. Pt doesn't remember . Normal at that time.  History   Sexual Activity   • Sexual activity: Yes   • Partners: Male   • Birth control/ protection: Female Sterilization     Comment:  , previous work as jeweller.     Sexual history: currently sexually active , Tubal ligation  H/O Abnormal Pap no  She  reports that she has never smoked. She has never used smokeless tobacco.        Allergies: Patient has no known allergies.     ROS:    Menses every month with 2 days spotting bleeding ( Still breast feeding )   Cramping is no.   She does not take OTC analgesics for cramping  No significant bloating/fluid retention, pelvic pain, or dyspareunia. No vaginal discharge   No breast tenderness, mass, nipple discharge, changes in size or contour, or abnormal cyclic discomfort.  No urinary tract symptoms, no incontinence, no polydipsia, polyuria,  No abdominal pain, change in bowel habits, black or bloody stools.    No unusual headaches, no visual changes, menstrual migraines   No prolonged cough. No dyspnea or chest pain on exertion.  No depression, labile mood, anxiety, libido changes, insomnia.  No temperature intolerance.  No new/concerning skin lesions, concerns.     Exercise: minimal exercise  Preventive Care: Uptodate    Current medicines (including changes today)  Current Outpatient Prescriptions   Medication Sig Dispense Refill   • levothyroxine (SYNTHROID) 75 MCG Tab Take 1 Tab by mouth Every morning on an empty stomach. 30 Tab 1   • sertraline (ZOLOFT) 25 MG tablet Take 1 Tab by mouth every day. 90 Tab 1   • ferrous sulfate 325 (65 Fe) MG EC tablet Take 1 Tab by mouth every day. (Patient not taking: Reported on  "4/4/2019) 90 Tab 1     No current facility-administered medications for this visit.      She  has a past medical history of TMJ arthritis.  She  has a past surgical history that includes other and repeat c section (8/14/2018).     Family History:   Family History   Problem Relation Age of Onset   • Arthritis Mother    • Hypertension Father    • Psychiatry Father    • Cancer Maternal Grandmother    • Diabetes Paternal Grandmother    • Diabetes Paternal Grandfather        OBJECTIVE:   /64 (BP Location: Right arm, Patient Position: Sitting, BP Cuff Size: Adult)   Pulse 68   Temp 36.7 °C (98.1 °F) (Temporal)   Ht 1.575 m (5' 2\")   Wt 68 kg (150 lb)   SpO2 97%   BMI 27.44 kg/m²   Body mass index is 27.44 kg/m².    HEAD AND NECK:  Ears normal.  Throat, oral cavity and tongue normal.  Neck supple. No adenopathy or masses in the neck or supraclavicular regions.  No carotid bruits. No thyromegaly. NEURO: Cranial nerves are normal. DTR's normal and symmetric.  CHEST:  Clear, good air entry, no wheezes or rales. HEART:  Regular rate and rhythm.  S1 and S2 normal. No edema or JVD. ABDOMEN:  Soft without tenderness, guarding, mass or organomegaly.  No CVA tenderness or inguinal adenopathy. EXTREMITIES:  Extremities, reflexes and peripheral pulses are normal. SKIN: color normal, vascularity normal, no edema, temperature normal   No rashes or suspicious skin lesions noted.     Breast Exam: Performed with instruction during examination. No axillary lymphadenopathy, no skin changes, no dominant masses. No nipple retraction  Pelvic Exam -  Normal external genitalia with no lesions. Normal vaginal mucosa with normal rugation and no discharge. Cervix with no visible lesions. No cervical motion tenderness. Uterus is normal sized with no masses. No adnexal tenderness or enlargement appreciated. Thin Prep Pap is obtained, vaginal swab is obtained and specimen(s) sent to lab  Rectal: deferred    <ASSESSMENT and PLAN>    1. Pap " smear for cervical cancer screening  - THINPREP PAP WITH HPV; Future    Discussed  breast self exam, feminine hygiene, family planning choices, adequate intake of calcium and vitamin D, diet and exercise   Follow-up in 1 years for next Gyn exam and 3 years for next Pap.   Next office visit for recheck of chronic medical conditions is due in 2 months

## 2019-06-07 DIAGNOSIS — F41.9 ANXIETY AND DEPRESSION: ICD-10-CM

## 2019-06-07 DIAGNOSIS — F32.A ANXIETY AND DEPRESSION: ICD-10-CM

## 2019-06-07 RX ORDER — SERTRALINE HYDROCHLORIDE 25 MG/1
25 TABLET, FILM COATED ORAL DAILY
Qty: 90 TAB | Refills: 1 | Status: SHIPPED | OUTPATIENT
Start: 2019-06-07 | End: 2019-06-13 | Stop reason: SDUPTHER

## 2019-06-07 NOTE — TELEPHONE ENCOUNTER
Patient moving, is requesting a couple more refills so she can have time to find a new pcp in [place]. Thank you.      Was the patient seen in the last year in this department? Yes    Does patient have an active prescription for medications requested? No     Received Request Via: Patient

## 2019-06-13 DIAGNOSIS — F32.A ANXIETY AND DEPRESSION: ICD-10-CM

## 2019-06-13 DIAGNOSIS — F41.9 ANXIETY AND DEPRESSION: ICD-10-CM

## 2019-06-13 RX ORDER — SERTRALINE HYDROCHLORIDE 25 MG/1
25 TABLET, FILM COATED ORAL DAILY
Qty: 90 TAB | Refills: 1 | Status: SHIPPED | OUTPATIENT
Start: 2019-06-13

## 2019-10-23 DIAGNOSIS — E03.9 HYPOTHYROIDISM, UNSPECIFIED TYPE: ICD-10-CM

## 2019-10-23 RX ORDER — LEVOTHYROXINE SODIUM 0.07 MG/1
75 TABLET ORAL
Qty: 90 TAB | Refills: 1 | Status: SHIPPED | OUTPATIENT
Start: 2019-10-23

## 2019-10-23 NOTE — TELEPHONE ENCOUNTER
Pt is requesting a 30 day supply. Please advise.         Was the patient seen in the last year in this department? Yes    Does patient have an active prescription for medications requested? Yes    Received Request Via: Patient

## 2022-02-28 NOTE — ED NOTES
Pt discharged to home. Pt was given follow up instructions and prescriptions for keflex. Pt verbalized understanding of all instructions for discharge and is ambulatory out of ED with steady gait.      Oxybutynin Counseling:  I discussed with the patient the risks of oxybutynin including but not limited to skin rash, drowsiness, dry mouth, difficulty urinating, and blurred vision.

## 2024-12-10 NOTE — PROGRESS NOTES
S: Pt presents with c/o abd cramping which began last night when vomiting. She states she can't keep anything down past 24hrs. Hx of GB  & BP issues, sees MFM. Denies VB/LOF.  +FM.     O: VSS, BP  120/81, afebrile, tachy Pulse 135. Abd soft, nt. FHTs 130s mod zechariah, accels. Uc q 5-7'. VE: closed thick high post. Hx C/S & , she plans repeat c/s for this preg.     A: , 35.1 egw   Dehydration   Probable UTI   Morbid obesity   Hx Cholestasis    P: Notified Dr Crook   IVF and bolus   Zofran 4mg IV   UA   Observation   Subjective:      Lou Vergara is a 32 y.o. female who presents for incision check status post           HPI patient is a 32-year-old who presents today for 2 week postop check status post .  Patient has a  delivery patient states she is doing well except for her incision opened.  A few days ago and she had some drainage.  Patient reports no erythema.  Patient reports normal bowel and bladder functions.  She denies any vaginal bleeding. she denies nausea vomiting or fevers.. Denies any dysuria.  Patient states she is breast pumping.  Baby is still in NICU due to some feeding difficulty.  Patient has no other concerns currently.  Patient denies any depression symptoms.  Patient reports just some soreness at the incision site and she does not take any pain medication anymore.    Review of Systems   Constitutional: Negative.    HENT: Negative.    Eyes: Negative.    Respiratory: Negative.    Cardiovascular: Negative.    Gastrointestinal: Negative for abdominal pain, blood in stool, constipation, diarrhea, heartburn, nausea and vomiting.   Genitourinary: Negative.    Musculoskeletal: Negative.    Skin: Negative.    Neurological: Negative.    Endo/Heme/Allergies: Negative.    Psychiatric/Behavioral: Negative.           Objective:     /72   Wt 71.2 kg (157 lb)   BMI 28.72 kg/m²      Physical Exam   Constitutional: She is oriented to person, place, and time. She appears well-nourished. No distress.   HENT:   Head: Normocephalic and atraumatic.   Cardiovascular: Normal rate, regular rhythm, normal heart sounds and intact distal pulses.    Abdominal: Soft. Bowel sounds are normal. She exhibits no distension and no mass. There is no guarding.    incision is intact and healed except for a 3 cm area at the midportion of the incision which is opened.  Good granulation tissue noted.  No erythema of the skin noted.  The incision was cleansed with hydrogen peroxide solution.  The skin  edges were reapproximated with benzoin and Steri-Strips. wound care was discussed with patient..   Musculoskeletal: Normal range of motion. She exhibits no edema, tenderness or deformity.   Neurological: She is alert and oriented to person, place, and time.   Skin: Skin is warm and dry. She is not diaphoretic.   Psychiatric: She has a normal mood and affect. Her behavior is normal. Judgment and thought content normal.   Nursing note and vitals reviewed.              Assessment/Plan:     1.  Patient presents for postop care status post .  Patient is doing well.  Baby is still in NICU due to feeding difficulty.  Pain is well controlled without medication.    2.  Superficial wound separation without evidence of infection.  Wound was cleansed and skin edge was reapproximated with benzoin and Steri-Strips.  Wound care was discussed.    3.  Precautions and plan of care were reviewed.  Patient to follow-up in 3 weeks for postpartum exam.

## (undated) DEVICE — STAPLER SKIN DISP - (6/BX 10BX/CA) VISISTAT

## (undated) DEVICE — DETERGENT RENUZYME PLUS 10 OZ PACKET (50/BX)

## (undated) DEVICE — 0 CHROMIC CT-1

## (undated) DEVICE — WATER IRRIG. STER. 1500 ML - (9/CA)

## (undated) DEVICE — SODIUM CHL IRRIGATION 0.9% 1000ML (12EA/CA)

## (undated) DEVICE — PAD LAP STERILE 18 X 18 - (5/PK 40PK/CA)

## (undated) DEVICE — CATHETER IV NON-SAFETY 18 GA X 1 1/4 (50/BX 4BX/CA)

## (undated) DEVICE — GLOVE, BIOGEL ECLIPSE, SZ 7.0, PF LTX (50/BX)

## (undated) DEVICE — PACK C-SECTION (2EA/CA)

## (undated) DEVICE — HEAD HOLDER JUNIOR/ADULT

## (undated) DEVICE — ELECTRODE DUAL RETURN W/ CORD - (50/PK)

## (undated) DEVICE — TUBING CLEARLINK DUO-VENT - C-FLO (48EA/CA)

## (undated) DEVICE — SUTURE 0 VICRYL PLUS CT-1 - 36 INCH (36/BX)

## (undated) DEVICE — TRAY SPINAL ANESTHESIA NON-SAFETY (10/CA)

## (undated) DEVICE — SET EXTENSION WITH 2 PORTS (48EA/CA) ***PART #2C8610 IS A SUBSTITUTE*****

## (undated) DEVICE — KIT  I.V. START (100EA/CA)